# Patient Record
Sex: FEMALE | Race: BLACK OR AFRICAN AMERICAN | URBAN - METROPOLITAN AREA
[De-identification: names, ages, dates, MRNs, and addresses within clinical notes are randomized per-mention and may not be internally consistent; named-entity substitution may affect disease eponyms.]

---

## 2017-01-13 ENCOUNTER — HOME CARE/HOSPICE - HEALTHEAST (OUTPATIENT)
Dept: HOME HEALTH SERVICES | Facility: HOME HEALTH | Age: 57
End: 2017-01-13

## 2017-01-23 ENCOUNTER — HOME CARE/HOSPICE - HEALTHEAST (OUTPATIENT)
Dept: HOME HEALTH SERVICES | Facility: HOME HEALTH | Age: 57
End: 2017-01-23

## 2017-02-06 ENCOUNTER — HOME CARE/HOSPICE - HEALTHEAST (OUTPATIENT)
Dept: HOME HEALTH SERVICES | Facility: HOME HEALTH | Age: 57
End: 2017-02-06

## 2017-02-20 ENCOUNTER — HOME CARE/HOSPICE - HEALTHEAST (OUTPATIENT)
Dept: HOME HEALTH SERVICES | Facility: HOME HEALTH | Age: 57
End: 2017-02-20

## 2017-07-08 ENCOUNTER — HEALTH MAINTENANCE LETTER (OUTPATIENT)
Age: 57
End: 2017-07-08

## 2021-05-25 ENCOUNTER — RECORDS - HEALTHEAST (OUTPATIENT)
Dept: ADMINISTRATIVE | Facility: CLINIC | Age: 61
End: 2021-05-25

## 2021-12-02 ENCOUNTER — TELEPHONE (OUTPATIENT)
Dept: FAMILY MEDICINE CLINIC | Facility: CLINIC | Age: 61
End: 2021-12-02

## 2021-12-03 ENCOUNTER — OFFICE VISIT (OUTPATIENT)
Dept: FAMILY MEDICINE CLINIC | Facility: CLINIC | Age: 61
End: 2021-12-03

## 2021-12-03 ENCOUNTER — LAB (OUTPATIENT)
Dept: LAB | Facility: HOSPITAL | Age: 61
End: 2021-12-03

## 2021-12-03 VITALS
HEART RATE: 82 BPM | DIASTOLIC BLOOD PRESSURE: 98 MMHG | BODY MASS INDEX: 47.98 KG/M2 | WEIGHT: 288 LBS | HEIGHT: 65 IN | TEMPERATURE: 97.3 F | SYSTOLIC BLOOD PRESSURE: 150 MMHG | OXYGEN SATURATION: 96 %

## 2021-12-03 DIAGNOSIS — M48.061 SPINAL STENOSIS OF LUMBAR REGION WITHOUT NEUROGENIC CLAUDICATION: ICD-10-CM

## 2021-12-03 DIAGNOSIS — Z11.59 NEED FOR HEPATITIS C SCREENING TEST: ICD-10-CM

## 2021-12-03 DIAGNOSIS — J44.9 CHRONIC OBSTRUCTIVE PULMONARY DISEASE, UNSPECIFIED COPD TYPE (HCC): ICD-10-CM

## 2021-12-03 DIAGNOSIS — G89.4 CHRONIC PAIN DISORDER: ICD-10-CM

## 2021-12-03 DIAGNOSIS — I10 ESSENTIAL HYPERTENSION: ICD-10-CM

## 2021-12-03 DIAGNOSIS — K21.9 GASTROESOPHAGEAL REFLUX DISEASE WITHOUT ESOPHAGITIS: ICD-10-CM

## 2021-12-03 DIAGNOSIS — E78.5 HYPERLIPIDEMIA, UNSPECIFIED HYPERLIPIDEMIA TYPE: ICD-10-CM

## 2021-12-03 DIAGNOSIS — M50.30 DEGENERATION OF CERVICAL DISC WITHOUT MYELOPATHY: ICD-10-CM

## 2021-12-03 DIAGNOSIS — E04.9 THYROID ENLARGEMENT: Primary | ICD-10-CM

## 2021-12-03 PROBLEM — M17.11 PRIMARY OSTEOARTHRITIS OF RIGHT KNEE: Status: ACTIVE | Noted: 2018-02-07

## 2021-12-03 PROBLEM — E55.9 VITAMIN D DEFICIENCY: Status: ACTIVE | Noted: 2021-12-03

## 2021-12-03 PROCEDURE — 99204 OFFICE O/P NEW MOD 45 MIN: CPT | Performed by: STUDENT IN AN ORGANIZED HEALTH CARE EDUCATION/TRAINING PROGRAM

## 2021-12-03 PROCEDURE — 80050 GENERAL HEALTH PANEL: CPT | Performed by: STUDENT IN AN ORGANIZED HEALTH CARE EDUCATION/TRAINING PROGRAM

## 2021-12-03 PROCEDURE — 86803 HEPATITIS C AB TEST: CPT | Performed by: STUDENT IN AN ORGANIZED HEALTH CARE EDUCATION/TRAINING PROGRAM

## 2021-12-03 PROCEDURE — 86376 MICROSOMAL ANTIBODY EACH: CPT | Performed by: STUDENT IN AN ORGANIZED HEALTH CARE EDUCATION/TRAINING PROGRAM

## 2021-12-03 PROCEDURE — 80061 LIPID PANEL: CPT | Performed by: STUDENT IN AN ORGANIZED HEALTH CARE EDUCATION/TRAINING PROGRAM

## 2021-12-03 PROCEDURE — 81001 URINALYSIS AUTO W/SCOPE: CPT | Performed by: STUDENT IN AN ORGANIZED HEALTH CARE EDUCATION/TRAINING PROGRAM

## 2021-12-03 RX ORDER — ALBUTEROL SULFATE 2.5 MG/3ML
2.5 SOLUTION RESPIRATORY (INHALATION) EVERY 4 HOURS PRN
Qty: 90 ML | Refills: 3 | Status: SHIPPED | OUTPATIENT
Start: 2021-12-03 | End: 2022-01-11 | Stop reason: SDUPTHER

## 2021-12-03 RX ORDER — AMLODIPINE BESYLATE 10 MG/1
10 TABLET ORAL DAILY
Qty: 90 TABLET | Refills: 1 | Status: SHIPPED | OUTPATIENT
Start: 2021-12-03 | End: 2022-04-28 | Stop reason: SDUPTHER

## 2021-12-03 RX ORDER — LOSARTAN POTASSIUM 25 MG/1
25 TABLET ORAL DAILY
COMMUNITY
End: 2021-12-03 | Stop reason: SDUPTHER

## 2021-12-03 RX ORDER — BACLOFEN 10 MG/1
10 TABLET ORAL 3 TIMES DAILY PRN
Qty: 90 TABLET | Refills: 1 | Status: SHIPPED | OUTPATIENT
Start: 2021-12-03 | End: 2022-04-28 | Stop reason: SDUPTHER

## 2021-12-03 RX ORDER — PANTOPRAZOLE SODIUM 40 MG/1
40 TABLET, DELAYED RELEASE ORAL 2 TIMES DAILY
Qty: 90 TABLET | Refills: 1 | Status: SHIPPED | OUTPATIENT
Start: 2021-12-03 | End: 2022-01-17 | Stop reason: SDUPTHER

## 2021-12-03 RX ORDER — MULTIPLE VITAMINS W/ MINERALS TAB 9MG-400MCG
1 TAB ORAL DAILY
COMMUNITY
End: 2022-06-02

## 2021-12-03 RX ORDER — IBUPROFEN 800 MG/1
800 TABLET ORAL EVERY 8 HOURS PRN
COMMUNITY
End: 2021-12-03 | Stop reason: SDUPTHER

## 2021-12-03 RX ORDER — LOSARTAN POTASSIUM 25 MG/1
25 TABLET ORAL DAILY
Qty: 90 TABLET | Refills: 1 | Status: SHIPPED | OUTPATIENT
Start: 2021-12-03 | End: 2022-04-28 | Stop reason: SDUPTHER

## 2021-12-03 RX ORDER — PANTOPRAZOLE SODIUM 40 MG/1
40 TABLET, DELAYED RELEASE ORAL 2 TIMES DAILY
COMMUNITY
End: 2021-12-03 | Stop reason: SDUPTHER

## 2021-12-03 RX ORDER — CHOLECALCIFEROL (VITAMIN D3) 50 MCG
2000 TABLET ORAL DAILY
COMMUNITY
End: 2021-12-03 | Stop reason: SDUPTHER

## 2021-12-03 RX ORDER — ALBUTEROL SULFATE 90 UG/1
2 AEROSOL, METERED RESPIRATORY (INHALATION) EVERY 4 HOURS PRN
COMMUNITY
End: 2021-12-03 | Stop reason: SDUPTHER

## 2021-12-03 RX ORDER — ALBUTEROL SULFATE 2.5 MG/3ML
2.5 SOLUTION RESPIRATORY (INHALATION) EVERY 4 HOURS PRN
COMMUNITY
End: 2021-12-03 | Stop reason: SDUPTHER

## 2021-12-03 RX ORDER — ATENOLOL 100 MG/1
100 TABLET ORAL DAILY
COMMUNITY
End: 2021-12-03 | Stop reason: SDUPTHER

## 2021-12-03 RX ORDER — ALBUTEROL SULFATE 90 UG/1
2 AEROSOL, METERED RESPIRATORY (INHALATION) EVERY 4 HOURS PRN
Qty: 18 G | Refills: 5 | Status: SHIPPED | OUTPATIENT
Start: 2021-12-03

## 2021-12-03 RX ORDER — AMLODIPINE BESYLATE 10 MG/1
10 TABLET ORAL DAILY
COMMUNITY
End: 2021-12-03 | Stop reason: SDUPTHER

## 2021-12-03 RX ORDER — ATENOLOL 50 MG/1
50 TABLET ORAL DAILY
Qty: 90 TABLET | Refills: 1 | Status: SHIPPED | OUTPATIENT
Start: 2021-12-03 | End: 2022-04-26

## 2021-12-03 RX ORDER — IBUPROFEN 800 MG/1
800 TABLET ORAL EVERY 8 HOURS PRN
Qty: 90 TABLET | Refills: 1 | Status: SHIPPED | OUTPATIENT
Start: 2021-12-03 | End: 2022-04-28 | Stop reason: SDUPTHER

## 2021-12-03 RX ORDER — SIMVASTATIN 40 MG
40 TABLET ORAL NIGHTLY
COMMUNITY
End: 2021-12-03 | Stop reason: SDUPTHER

## 2021-12-03 RX ORDER — ATENOLOL 50 MG/1
50 TABLET ORAL DAILY
COMMUNITY
End: 2021-12-03 | Stop reason: SDUPTHER

## 2021-12-03 RX ORDER — CHOLECALCIFEROL (VITAMIN D3) 50 MCG
2000 TABLET ORAL DAILY
Qty: 90 TABLET | Refills: 1 | Status: SHIPPED | OUTPATIENT
Start: 2021-12-03 | End: 2022-02-07

## 2021-12-03 RX ORDER — SIMVASTATIN 40 MG
40 TABLET ORAL NIGHTLY
Qty: 90 TABLET | Refills: 1 | Status: SHIPPED | OUTPATIENT
Start: 2021-12-03 | End: 2022-04-28 | Stop reason: SDUPTHER

## 2021-12-03 RX ORDER — ATENOLOL 100 MG/1
100 TABLET ORAL DAILY
Qty: 90 TABLET | Refills: 1 | Status: SHIPPED | OUTPATIENT
Start: 2021-12-03 | End: 2022-04-26

## 2021-12-03 RX ORDER — BACLOFEN 10 MG/1
10 TABLET ORAL 3 TIMES DAILY PRN
COMMUNITY
End: 2021-12-03 | Stop reason: SDUPTHER

## 2021-12-03 NOTE — PROGRESS NOTES
Subjective:  Norah Urbina is a 61 y.o. female who presents for establish care.    COPD; currently smokes half pack a day for 37 years, on albuterol as needed, no chest pain, shortness of breath, dyspnea on exertion, wheezing, cough, fever, chills, nausea, vomiting.  Denies any nighttime awakenings, uses albuterol approximately once per week, has not had any formal testing before. Does have seasonal allergies which she takes cetirizine    GERD; currently on pantoprazole 40 mg daily, tolerate medication well, no adverse effects.  Denies dysphagia, early satiety, hematemesis, hematochezia.  Has had an EGD in the past which was reportedly normal.  No night sweats or unintentional weight loss.    Hypertension; currently on losartan 25 mg daily, atenolol 100 mg a.m., 50 mg p.m., Amlodipine 10 mg daily.  Denied issues medication, blood pressure today was 150/98, states blood pressure at home has been well controlled but ran out of her BP medications yesterday.  Denied any headaches or vision changes.    Chronic pain; has had multiple lower back surgeries and subsequent chronic pain. Is allergic to all opiate medications, was doing PT/OT and getting injections/nerve ablation therapy. Currently uses a walker to ambulate.  Denied any recent exacerbation, trauma, numbness, tingling, weakness.    Patient Active Problem List   Diagnosis   • Chronic pain disorder   • Controlled substance agreement signed   • Degeneration of cervical disc without myelopathy   • Esophageal reflux   • Essential hypertension   • Hip pain, bilateral   • Hyperlipidemia   • Lumbar spinal stenosis   • Morbid obesity with BMI of 40.0-44.9, adult (HCC)   • Primary osteoarthritis of right knee   • Serum calcium elevated   • Tobacco use disorder   • Vitamin D deficiency     Vitals:    Vitals:    12/03/21 0935   BP: 150/98   BP Location: Right arm   Patient Position: Sitting   Cuff Size: Large Adult   Pulse: 82   Temp: 97.3 °F (36.3 °C)   SpO2: 96%  "  Weight: 131 kg (288 lb)   Height: 165.1 cm (65\")     Body mass index is 47.93 kg/m².      Current Outpatient Medications:   •  albuterol (PROVENTIL) (2.5 MG/3ML) 0.083% nebulizer solution, Take 2.5 mg by nebulization Every 4 (Four) Hours As Needed for Wheezing., Disp: 90 mL, Rfl: 3  •  albuterol sulfate  (90 Base) MCG/ACT inhaler, Inhale 2 puffs Every 4 (Four) Hours As Needed for Wheezing., Disp: 18 g, Rfl: 5  •  amLODIPine (NORVASC) 10 MG tablet, Take 1 tablet by mouth Daily., Disp: 90 tablet, Rfl: 1  •  atenolol (TENORMIN) 100 MG tablet, Take 1 tablet by mouth Daily. 100 in AM, Disp: 90 tablet, Rfl: 1  •  atenolol (TENORMIN) 50 MG tablet, Take 1 tablet by mouth Daily. 50 in PM, Disp: 90 tablet, Rfl: 1  •  baclofen (LIORESAL) 10 MG tablet, Take 1 tablet by mouth 3 (Three) Times a Day As Needed for Muscle Spasms., Disp: 90 tablet, Rfl: 1  •  Cholecalciferol (Vitamin D) 50 MCG (2000 UT) tablet, Take 2,000 Units by mouth Daily., Disp: 90 tablet, Rfl: 1  •  ibuprofen (ADVIL,MOTRIN) 800 MG tablet, Take 1 tablet by mouth Every 8 (Eight) Hours As Needed for Mild Pain  or Moderate Pain ., Disp: 90 tablet, Rfl: 1  •  losartan (COZAAR) 25 MG tablet, Take 1 tablet by mouth Daily., Disp: 90 tablet, Rfl: 1  •  multivitamin with minerals (WOMENS MULTIVITAMIN PO), Take 1 tablet by mouth Daily. gummy, Disp: , Rfl:   •  pantoprazole (PROTONIX) 40 MG EC tablet, Take 1 tablet by mouth 2 (Two) Times a Day., Disp: 90 tablet, Rfl: 1  •  Probiotic Product (PROBIOTIC PO), Take 1 capsule by mouth Daily., Disp: , Rfl:   •  simvastatin (ZOCOR) 40 MG tablet, Take 1 tablet by mouth Every Night., Disp: 90 tablet, Rfl: 1    Patient Active Problem List   Diagnosis   • Chronic pain disorder   • Controlled substance agreement signed   • Degeneration of cervical disc without myelopathy   • Esophageal reflux   • Essential hypertension   • Hip pain, bilateral   • Hyperlipidemia   • Lumbar spinal stenosis   • Morbid obesity with BMI of " 40.0-44.9, adult (HCC)   • Primary osteoarthritis of right knee   • Serum calcium elevated   • Tobacco use disorder   • Vitamin D deficiency     Past Surgical History:   Procedure Laterality Date   • ARM NERVE EXPLORATION Bilateral     both elbows   • BACK SURGERY      x2   • EXPLORATORY LAPAROTOMY     • MOUTH SURGERY      x2   • REPLACEMENT TOTAL KNEE Right    • SHOULDER SURGERY Bilateral     exploratory surgery x5   • TUBAL ABDOMINAL LIGATION       Social History     Socioeconomic History   • Marital status: Single   Tobacco Use   • Smoking status: Current Every Day Smoker     Packs/day: 0.50     Types: Cigarettes   • Smokeless tobacco: Never Used   Vaping Use   • Vaping Use: Never used   Substance and Sexual Activity   • Alcohol use: Not Currently   • Drug use: Never   • Sexual activity: Not Currently     History reviewed. No pertinent family history.  No results found for any previous visit.      No image results found.      @[facility]@  Immunization History   Administered Date(s) Administered   • Tdap 01/11/2018     The following portions of the patient's history were reviewed and updated as appropriate: allergies, current medications, past family history, past medical history, past social history, past surgical history and problem list.    PHQ-9 Total Score: 0         Physical Exam  Constitutional:       Appearance: Normal appearance. She is obese.      Comments: Ambulating with walker   HENT:      Head: Normocephalic and atraumatic.      Right Ear: External ear normal.      Left Ear: External ear normal.   Eyes:      General:         Right eye: No discharge.         Left eye: No discharge.      Conjunctiva/sclera: Conjunctivae normal.   Cardiovascular:      Rate and Rhythm: Normal rate and regular rhythm.      Pulses: Normal pulses.      Heart sounds: Normal heart sounds. No murmur heard.      Pulmonary:      Effort: Pulmonary effort is normal. No respiratory distress.      Breath sounds: Normal breath  sounds.   Abdominal:      General: There is no distension.      Palpations: Abdomen is soft.      Tenderness: There is no abdominal tenderness.   Musculoskeletal:      Cervical back: Normal range of motion.      Right lower leg: No edema.      Left lower leg: No edema.   Lymphadenopathy:      Cervical: No cervical adenopathy.   Neurological:      Mental Status: She is alert. Mental status is at baseline.   Psychiatric:         Mood and Affect: Mood normal.         Behavior: Behavior normal.         Assessment/Plan    Diagnosis Plan   1. Thyroid enlargement  TSH Rfx On Abnormal To Free T4    US Thyroid    Thyroid Peroxidase Antibody   2. Hyperlipidemia, unspecified hyperlipidemia type  Lipid Panel    simvastatin (ZOCOR) 40 MG tablet   3. Essential hypertension  CBC & Differential    Comprehensive Metabolic Panel    amLODIPine (NORVASC) 10 MG tablet    atenolol (TENORMIN) 100 MG tablet    atenolol (TENORMIN) 50 MG tablet    losartan (COZAAR) 25 MG tablet    Urinalysis With Microscopic - Urine, Clean Catch   4. Need for hepatitis C screening test  HCV Antibody Rfx To Qnt PCR   5. Chronic obstructive pulmonary disease, unspecified COPD type (HCC)  albuterol (PROVENTIL) (2.5 MG/3ML) 0.083% nebulizer solution    albuterol sulfate  (90 Base) MCG/ACT inhaler   6. Gastroesophageal reflux disease without esophagitis  pantoprazole (PROTONIX) 40 MG EC tablet   7. Chronic pain disorder  baclofen (LIORESAL) 10 MG tablet    ibuprofen (ADVIL,MOTRIN) 800 MG tablet    Ambulatory Referral to Pain Management    Ambulatory Referral to Physical Therapy Evaluate and treat   8. Degeneration of cervical disc without myelopathy  baclofen (LIORESAL) 10 MG tablet    ibuprofen (ADVIL,MOTRIN) 800 MG tablet    Ambulatory Referral to Pain Management    Ambulatory Referral to Physical Therapy Evaluate and treat   9. Spinal stenosis of lumbar region without neurogenic claudication  baclofen (LIORESAL) 10 MG tablet    ibuprofen (ADVIL,MOTRIN)  800 MG tablet    Ambulatory Referral to Pain Management    Ambulatory Referral to Physical Therapy Evaluate and treat      Orders Placed This Encounter   Procedures   • US Thyroid     Standing Status:   Future     Standing Expiration Date:   12/3/2022     Order Specific Question:   Reason for Exam:     Answer:   diffuse thyroid enlargement on MRI     Order Specific Question:   Release to patient     Answer:   Immediate   • Comprehensive Metabolic Panel     Order Specific Question:   Release to patient     Answer:   Immediate   • TSH Rfx On Abnormal To Free T4     Order Specific Question:   Release to patient     Answer:   Immediate   • Lipid Panel   • HCV Antibody Rfx To Qnt PCR     Order Specific Question:   Release to patient     Answer:   Immediate   • Thyroid Peroxidase Antibody     Order Specific Question:   Release to patient     Answer:   Immediate   • CBC Auto Differential   • Urinalysis without microscopic (no culture) - Urine, Clean Catch     Order Specific Question:   Release to patient     Answer:   Immediate   • Urinalysis, Microscopic Only - Urine, Clean Catch     Order Specific Question:   Release to patient     Answer:   Immediate   • Ambulatory Referral to Pain Management     Referral Priority:   Routine     Referral Type:   Pain Management     Referral Reason:   Specialty Services Required     Requested Specialty:   Pain Medicine     Number of Visits Requested:   1   • Ambulatory Referral to Physical Therapy Evaluate and treat     Referral Priority:   Routine     Referral Type:   Physical Therapy     Referral Reason:   Specialty Services Required     Requested Specialty:   Physical Therapy     Number of Visits Requested:   1   • CBC & Differential     Order Specific Question:   Manual Differential     Answer:   No   • Urinalysis With Microscopic - Urine, Clean Catch     Order Specific Question:   Release to patient     Answer:   Immediate      Thyroid enlargement; asymptomatic physical exam, will  obtain lab work as above, denies dysphagia or additional red flags, reassuring.    Hyperlipidemia; currently on simvastatin 40 mg daily, tolerating occasion well, no adverse effects, will continue and obtain lipid levels as above.    Hypertension; elevated today however did not take her medication prior to today's visit as she ran out, denies any adverse effects with medication, states blood pressure well controlled, will refill, follow-up 1 month with blood pressure log, sooner if needed.    COPD?;  Patient on albuterol as needed, has been using weekly, no red flags, lungs clear to auscultation bilaterally, afebrile, saturating well on room air, reassuring.  Will obtain PFTs as above, counseled importance of smoke cessation, patient has tried nicotine replacement therapies and oral therapies without relief.    Chronic pain secondary to multiple back surgeries; will refer to pain management, physical therapy as she was seeing them in [place] and receiving nerve ablation, injections, pain medications and therapy.  Will obtain prior records.  Patient appropriate on exam, no signs of abuse, misuse.  Denies any acute exacerbations, reassuring.    We will obtain prior records, follow-up in 1 month.      This document has been electronically signed by Santiago Quan MD on December 3, 2021 11:18 CST

## 2021-12-04 LAB
ALBUMIN SERPL-MCNC: 4.5 G/DL (ref 3.5–5.2)
ALBUMIN/GLOB SERPL: 1.5 G/DL
ALP SERPL-CCNC: 94 U/L (ref 39–117)
ALT SERPL W P-5'-P-CCNC: 22 U/L (ref 1–33)
ANION GAP SERPL CALCULATED.3IONS-SCNC: 9.5 MMOL/L (ref 5–15)
AST SERPL-CCNC: 27 U/L (ref 1–32)
BACTERIA UR QL AUTO: ABNORMAL /HPF
BASOPHILS # BLD AUTO: 0.07 10*3/MM3 (ref 0–0.2)
BASOPHILS NFR BLD AUTO: 0.9 % (ref 0–1.5)
BILIRUB SERPL-MCNC: 0.4 MG/DL (ref 0–1.2)
BILIRUB UR QL STRIP: NEGATIVE
BUN SERPL-MCNC: 13 MG/DL (ref 8–23)
BUN/CREAT SERPL: 14 (ref 7–25)
CALCIUM SPEC-SCNC: 9.7 MG/DL (ref 8.6–10.5)
CHLORIDE SERPL-SCNC: 101 MMOL/L (ref 98–107)
CHOLEST SERPL-MCNC: 202 MG/DL (ref 0–200)
CLARITY UR: CLEAR
CO2 SERPL-SCNC: 26.5 MMOL/L (ref 22–29)
COLOR UR: YELLOW
CREAT SERPL-MCNC: 0.93 MG/DL (ref 0.57–1)
DEPRECATED RDW RBC AUTO: 44.5 FL (ref 37–54)
EOSINOPHIL # BLD AUTO: 0.09 10*3/MM3 (ref 0–0.4)
EOSINOPHIL NFR BLD AUTO: 1.1 % (ref 0.3–6.2)
ERYTHROCYTE [DISTWIDTH] IN BLOOD BY AUTOMATED COUNT: 14.4 % (ref 12.3–15.4)
GFR SERPL CREATININE-BSD FRML MDRD: 74 ML/MIN/1.73
GLOBULIN UR ELPH-MCNC: 3 GM/DL
GLUCOSE SERPL-MCNC: 102 MG/DL (ref 65–99)
GLUCOSE UR STRIP-MCNC: NEGATIVE MG/DL
HCT VFR BLD AUTO: 46.5 % (ref 34–46.6)
HDLC SERPL-MCNC: 55 MG/DL (ref 40–60)
HGB BLD-MCNC: 14.4 G/DL (ref 12–15.9)
HGB UR QL STRIP.AUTO: ABNORMAL
HYALINE CASTS UR QL AUTO: ABNORMAL /LPF
IMM GRANULOCYTES # BLD AUTO: 0.02 10*3/MM3 (ref 0–0.05)
IMM GRANULOCYTES NFR BLD AUTO: 0.2 % (ref 0–0.5)
KETONES UR QL STRIP: NEGATIVE
LDLC SERPL CALC-MCNC: 110 MG/DL (ref 0–100)
LDLC/HDLC SERPL: 1.89 {RATIO}
LEUKOCYTE ESTERASE UR QL STRIP.AUTO: NEGATIVE
LYMPHOCYTES # BLD AUTO: 2.25 10*3/MM3 (ref 0.7–3.1)
LYMPHOCYTES NFR BLD AUTO: 27.7 % (ref 19.6–45.3)
MCH RBC QN AUTO: 26.3 PG (ref 26.6–33)
MCHC RBC AUTO-ENTMCNC: 31 G/DL (ref 31.5–35.7)
MCV RBC AUTO: 84.9 FL (ref 79–97)
MONOCYTES # BLD AUTO: 0.49 10*3/MM3 (ref 0.1–0.9)
MONOCYTES NFR BLD AUTO: 6 % (ref 5–12)
NEUTROPHILS NFR BLD AUTO: 5.21 10*3/MM3 (ref 1.7–7)
NEUTROPHILS NFR BLD AUTO: 64.1 % (ref 42.7–76)
NITRITE UR QL STRIP: NEGATIVE
NRBC BLD AUTO-RTO: 0 /100 WBC (ref 0–0.2)
PH UR STRIP.AUTO: 6 [PH] (ref 5–8)
PLATELET # BLD AUTO: 250 10*3/MM3 (ref 140–450)
PMV BLD AUTO: 12.3 FL (ref 6–12)
POTASSIUM SERPL-SCNC: 4.1 MMOL/L (ref 3.5–5.2)
PROT SERPL-MCNC: 7.5 G/DL (ref 6–8.5)
PROT UR QL STRIP: NEGATIVE
RBC # BLD AUTO: 5.48 10*6/MM3 (ref 3.77–5.28)
RBC # UR STRIP: ABNORMAL /HPF
REF LAB TEST METHOD: ABNORMAL
SODIUM SERPL-SCNC: 137 MMOL/L (ref 136–145)
SP GR UR STRIP: 1.01 (ref 1–1.03)
SQUAMOUS #/AREA URNS HPF: ABNORMAL /HPF
TRIGL SERPL-MCNC: 215 MG/DL (ref 0–150)
TSH SERPL DL<=0.05 MIU/L-ACNC: 0.94 UIU/ML (ref 0.27–4.2)
UROBILINOGEN UR QL STRIP: ABNORMAL
VLDLC SERPL-MCNC: 37 MG/DL (ref 5–40)
WBC # UR STRIP: ABNORMAL /HPF
WBC NRBC COR # BLD: 8.13 10*3/MM3 (ref 3.4–10.8)

## 2021-12-05 LAB
HCV AB S/CO SERPL IA: <0.1 S/CO RATIO (ref 0–0.9)
HCV AB SERPL QL IA: NORMAL
THYROPEROXIDASE AB SERPL-ACNC: <8 IU/ML (ref 0–34)

## 2021-12-07 ENCOUNTER — TELEPHONE (OUTPATIENT)
Dept: FAMILY MEDICINE CLINIC | Facility: CLINIC | Age: 61
End: 2021-12-07

## 2021-12-07 DIAGNOSIS — J44.9 CHRONIC OBSTRUCTIVE PULMONARY DISEASE, UNSPECIFIED COPD TYPE (HCC): Primary | ICD-10-CM

## 2021-12-07 NOTE — TELEPHONE ENCOUNTER
Patient recently moved here and was wondering if she needed to fill out any paperwork for her daughter to be her caregiver. Contact number is [phone]

## 2021-12-08 NOTE — TELEPHONE ENCOUNTER
If she wants it legally, she will need to see an  or a  to get her daughter to be power of  for her to be in charge of her financial and medical. She also could go to court to get her to be POA.    LVM

## 2021-12-08 NOTE — TELEPHONE ENCOUNTER
Pt called back and I explained the above mentioned information. She also said that she needs a script for a new nebulizer. I told her I would put it in and that I would send it to [facility]. I gave her the number to call. She voiced understanding.    Faxed order over.

## 2021-12-28 ENCOUNTER — TELEPHONE (OUTPATIENT)
Dept: FAMILY MEDICINE CLINIC | Facility: CLINIC | Age: 61
End: 2021-12-28

## 2021-12-28 NOTE — TELEPHONE ENCOUNTER
----- Message from Santiago Quan MD sent at 12/22/2021  2:47 PM CST -----  Overall, labs reassuring for thyroid disease, hepatitis C, kidney disease, liver disease.  Cholesterol remains to be elevated, please continue to take simvastatin daily.

## 2022-01-04 ENCOUNTER — TELEPHONE (OUTPATIENT)
Dept: FAMILY MEDICINE CLINIC | Facility: CLINIC | Age: 62
End: 2022-01-04

## 2022-01-04 ENCOUNTER — OFFICE VISIT (OUTPATIENT)
Dept: FAMILY MEDICINE CLINIC | Facility: CLINIC | Age: 62
End: 2022-01-04

## 2022-01-04 VITALS
TEMPERATURE: 96.6 F | BODY MASS INDEX: 47.48 KG/M2 | OXYGEN SATURATION: 96 % | HEIGHT: 65 IN | WEIGHT: 285 LBS | SYSTOLIC BLOOD PRESSURE: 138 MMHG | HEART RATE: 73 BPM | DIASTOLIC BLOOD PRESSURE: 86 MMHG

## 2022-01-04 DIAGNOSIS — I10 ESSENTIAL HYPERTENSION: ICD-10-CM

## 2022-01-04 DIAGNOSIS — M48.02 CERVICAL STENOSIS OF SPINE: Primary | ICD-10-CM

## 2022-01-04 DIAGNOSIS — M48.061 SPINAL STENOSIS OF LUMBAR REGION WITHOUT NEUROGENIC CLAUDICATION: ICD-10-CM

## 2022-01-04 PROCEDURE — 99214 OFFICE O/P EST MOD 30 MIN: CPT | Performed by: STUDENT IN AN ORGANIZED HEALTH CARE EDUCATION/TRAINING PROGRAM

## 2022-01-04 NOTE — PROGRESS NOTES
"Subjective:  Norah Urbina is a 61 y.o. female who presents for     Chronic pain secondary to multiple back surgeries; at previous appointment was referred to pain management, physical therapy patient states that has not heard back.  Denied acute complaints, states and has had chronic numbness, tingling and pain of bilateral hands.  Denied trauma, swelling, fever, chills, nausea, vomiting, weakness.    Hypertension; previous appointment, patient with elevated blood pressure however did not have medications, currently on losartan 25 mg daily, atenolol 50 mg twice daily, amlodipine 10 mg daily, does not check BP at home.    Health Maintenance; patient states mammogram was completed in [place] last August, reportedly normal, states had colonoscopy ~ 3 years ago, had some polyps removed but does not remember follow up recommendations.     Patient Active Problem List   Diagnosis   • Chronic pain disorder   • Controlled substance agreement signed   • Degeneration of cervical disc without myelopathy   • Esophageal reflux   • Essential hypertension   • Hip pain, bilateral   • Hyperlipidemia   • Lumbar spinal stenosis   • Morbid obesity with BMI of 40.0-44.9, adult (HCC)   • Primary osteoarthritis of right knee   • Serum calcium elevated   • Tobacco use disorder   • Vitamin D deficiency     Vitals:    Vitals:    01/04/22 1008   BP: 138/86   BP Location: Right arm   Patient Position: Sitting   Cuff Size: Large Adult   Pulse: 73   Temp: 96.6 °F (35.9 °C)   SpO2: 96%   Weight: 129 kg (285 lb)   Height: 165.1 cm (65\")     Body mass index is 47.43 kg/m².      Current Outpatient Medications:   •  albuterol (PROVENTIL) (2.5 MG/3ML) 0.083% nebulizer solution, Take 2.5 mg by nebulization Every 4 (Four) Hours As Needed for Wheezing., Disp: 90 mL, Rfl: 3  •  albuterol sulfate  (90 Base) MCG/ACT inhaler, Inhale 2 puffs Every 4 (Four) Hours As Needed for Wheezing., Disp: 18 g, Rfl: 5  •  amLODIPine (NORVASC) 10 MG tablet, Take 1 " tablet by mouth Daily., Disp: 90 tablet, Rfl: 1  •  atenolol (TENORMIN) 100 MG tablet, Take 1 tablet by mouth Daily. 100 in AM, Disp: 90 tablet, Rfl: 1  •  atenolol (TENORMIN) 50 MG tablet, Take 1 tablet by mouth Daily. 50 in PM, Disp: 90 tablet, Rfl: 1  •  baclofen (LIORESAL) 10 MG tablet, Take 1 tablet by mouth 3 (Three) Times a Day As Needed for Muscle Spasms., Disp: 90 tablet, Rfl: 1  •  ibuprofen (ADVIL,MOTRIN) 800 MG tablet, Take 1 tablet by mouth Every 8 (Eight) Hours As Needed for Mild Pain  or Moderate Pain ., Disp: 90 tablet, Rfl: 1  •  losartan (COZAAR) 25 MG tablet, Take 1 tablet by mouth Daily., Disp: 90 tablet, Rfl: 1  •  multivitamin with minerals (WOMENS MULTIVITAMIN PO), Take 1 tablet by mouth Daily. gummy, Disp: , Rfl:   •  Probiotic Product (PROBIOTIC PO), Take 1 capsule by mouth Daily., Disp: , Rfl:   •  simvastatin (ZOCOR) 40 MG tablet, Take 1 tablet by mouth Every Night., Disp: 90 tablet, Rfl: 1  •  Cholecalciferol (Vitamin D) 50 MCG (2000 UT) tablet, Take 2,000 Units by mouth Daily., Disp: 90 tablet, Rfl: 1  •  pantoprazole (PROTONIX) 40 MG EC tablet, Take 1 tablet by mouth 2 (Two) Times a Day., Disp: 90 tablet, Rfl: 1    Patient Active Problem List   Diagnosis   • Chronic pain disorder   • Controlled substance agreement signed   • Degeneration of cervical disc without myelopathy   • Esophageal reflux   • Essential hypertension   • Hip pain, bilateral   • Hyperlipidemia   • Lumbar spinal stenosis   • Morbid obesity with BMI of 40.0-44.9, adult (HCC)   • Primary osteoarthritis of right knee   • Serum calcium elevated   • Tobacco use disorder   • Vitamin D deficiency     Past Surgical History:   Procedure Laterality Date   • ARM NERVE EXPLORATION Bilateral     both elbows   • BACK SURGERY      x2   • EXPLORATORY LAPAROTOMY     • MOUTH SURGERY      x2   • REPLACEMENT TOTAL KNEE Right    • SHOULDER SURGERY Bilateral     exploratory surgery x5   • TUBAL ABDOMINAL LIGATION       Social History      Socioeconomic History   • Marital status: Single   Tobacco Use   • Smoking status: Current Every Day Smoker     Packs/day: 0.50     Types: Cigarettes   • Smokeless tobacco: Never Used   Vaping Use   • Vaping Use: Never used   Substance and Sexual Activity   • Alcohol use: Not Currently   • Drug use: Yes     Types: Marijuana     Comment: once every 2 weeks or so   • Sexual activity: Not Currently     History reviewed. No pertinent family history.  Office Visit on 12/03/2021   Component Date Value Ref Range Status   • Glucose 12/03/2021 102* 65 - 99 mg/dL Final   • BUN 12/03/2021 13  8 - 23 mg/dL Final   • Creatinine 12/03/2021 0.93  0.57 - 1.00 mg/dL Final   • Sodium 12/03/2021 137  136 - 145 mmol/L Final   • Potassium 12/03/2021 4.1  3.5 - 5.2 mmol/L Final   • Chloride 12/03/2021 101  98 - 107 mmol/L Final   • CO2 12/03/2021 26.5  22.0 - 29.0 mmol/L Final   • Calcium 12/03/2021 9.7  8.6 - 10.5 mg/dL Final   • Total Protein 12/03/2021 7.5  6.0 - 8.5 g/dL Final   • Albumin 12/03/2021 4.50  3.50 - 5.20 g/dL Final   • ALT (SGPT) 12/03/2021 22  1 - 33 U/L Final   • AST (SGOT) 12/03/2021 27  1 - 32 U/L Final   • Alkaline Phosphatase 12/03/2021 94  39 - 117 U/L Final   • Total Bilirubin 12/03/2021 0.4  0.0 - 1.2 mg/dL Final   • eGFR   Amer 12/03/2021 74  >60 mL/min/1.73 Final   • Globulin 12/03/2021 3.0  gm/dL Final   • A/G Ratio 12/03/2021 1.5  g/dL Final   • BUN/Creatinine Ratio 12/03/2021 14.0  7.0 - 25.0 Final   • Anion Gap 12/03/2021 9.5  5.0 - 15.0 mmol/L Final   • TSH 12/03/2021 0.944  0.270 - 4.200 uIU/mL Final   • Total Cholesterol 12/03/2021 202* 0 - 200 mg/dL Final   • Triglycerides 12/03/2021 215* 0 - 150 mg/dL Final   • HDL Cholesterol 12/03/2021 55  40 - 60 mg/dL Final   • LDL Cholesterol  12/03/2021 110* 0 - 100 mg/dL Final   • VLDL Cholesterol 12/03/2021 37  5 - 40 mg/dL Final   • LDL/HDL Ratio 12/03/2021 1.89   Final   • Hepatitis C Ab 12/03/2021 <0.1  0.0 - 0.9 s/co ratio Final   • Thyroid  Peroxidase Antibody 12/03/2021 <8  0 - 34 IU/mL Final   • WBC 12/03/2021 8.13  3.40 - 10.80 10*3/mm3 Final   • RBC 12/03/2021 5.48* 3.77 - 5.28 10*6/mm3 Final   • Hemoglobin 12/03/2021 14.4  12.0 - 15.9 g/dL Final   • Hematocrit 12/03/2021 46.5  34.0 - 46.6 % Final   • MCV 12/03/2021 84.9  79.0 - 97.0 fL Final   • MCH 12/03/2021 26.3* 26.6 - 33.0 pg Final   • MCHC 12/03/2021 31.0* 31.5 - 35.7 g/dL Final   • RDW 12/03/2021 14.4  12.3 - 15.4 % Final   • RDW-SD 12/03/2021 44.5  37.0 - 54.0 fl Final   • MPV 12/03/2021 12.3* 6.0 - 12.0 fL Final   • Platelets 12/03/2021 250  140 - 450 10*3/mm3 Final   • Neutrophil % 12/03/2021 64.1  42.7 - 76.0 % Final   • Lymphocyte % 12/03/2021 27.7  19.6 - 45.3 % Final   • Monocyte % 12/03/2021 6.0  5.0 - 12.0 % Final   • Eosinophil % 12/03/2021 1.1  0.3 - 6.2 % Final   • Basophil % 12/03/2021 0.9  0.0 - 1.5 % Final   • Immature Grans % 12/03/2021 0.2  0.0 - 0.5 % Final   • Neutrophils, Absolute 12/03/2021 5.21  1.70 - 7.00 10*3/mm3 Final   • Lymphocytes, Absolute 12/03/2021 2.25  0.70 - 3.10 10*3/mm3 Final   • Monocytes, Absolute 12/03/2021 0.49  0.10 - 0.90 10*3/mm3 Final   • Eosinophils, Absolute 12/03/2021 0.09  0.00 - 0.40 10*3/mm3 Final   • Basophils, Absolute 12/03/2021 0.07  0.00 - 0.20 10*3/mm3 Final   • Immature Grans, Absolute 12/03/2021 0.02  0.00 - 0.05 10*3/mm3 Final   • nRBC 12/03/2021 0.0  0.0 - 0.2 /100 WBC Final   • Color, UA 12/03/2021 Yellow  Yellow, Straw Final   • Appearance, UA 12/03/2021 Clear  Clear Final   • pH, UA 12/03/2021 6.0  5.0 - 8.0 Final   • Specific Gravity, UA 12/03/2021 1.009  1.005 - 1.030 Final   • Glucose, UA 12/03/2021 Negative  Negative Final   • Ketones, UA 12/03/2021 Negative  Negative Final   • Bilirubin, UA 12/03/2021 Negative  Negative Final   • Blood, UA 12/03/2021 Trace* Negative Final   • Protein, UA 12/03/2021 Negative  Negative Final   • Leuk Esterase, UA 12/03/2021 Negative  Negative Final   • Nitrite, UA 12/03/2021 Negative   Negative Final   • Urobilinogen, UA 12/03/2021 0.2 E.U./dL  0.2 - 1.0 E.U./dL Final   • RBC, UA 12/03/2021 0-2  None Seen, 0-2 /HPF Final   • WBC, UA 12/03/2021 0-2  None Seen, 0-2 /HPF Final   • Bacteria, UA 12/03/2021 2+* None Seen /HPF Final   • Squamous Epithelial Cells, UA 12/03/2021 3-6* None Seen, 0-2 /HPF Final   • Hyaline Casts, UA 12/03/2021 0-2  None Seen /LPF Final   • Methodology 12/03/2021 Automated Microscopy   Final   • Interpretation 12/03/2021 Comment   Final    Negative  Not infected with HCV, unless recent infection is suspected or other  evidence exists to indicate HCV infection.      No image results found.      [email or web address]  Immunization History   Administered Date(s) Administered   • Tdap 01/11/2018     The following portions of the patient's history were reviewed and updated as appropriate: allergies, current medications, past family history, past medical history, past social history, past surgical history and problem list.    PHQ-9 Total Score: 0         Physical Exam  Constitutional:       Appearance: Normal appearance.   HENT:      Head: Normocephalic and atraumatic.      Right Ear: External ear normal.      Left Ear: External ear normal.   Eyes:      General:         Right eye: No discharge.         Left eye: No discharge.      Conjunctiva/sclera: Conjunctivae normal.   Cardiovascular:      Rate and Rhythm: Normal rate and regular rhythm.      Pulses: Normal pulses.           Radial pulses are 2+ on the right side and 2+ on the left side.      Heart sounds: Normal heart sounds. No murmur heard.      Pulmonary:      Effort: Pulmonary effort is normal. No respiratory distress.      Breath sounds: Normal breath sounds.   Abdominal:      General: There is no distension.      Palpations: Abdomen is soft.      Tenderness: There is no abdominal tenderness.   Musculoskeletal:      Cervical back: Normal range of motion.      Right lower leg: No edema.      Left lower leg: No edema.    Lymphadenopathy:      Cervical: No cervical adenopathy.   Neurological:      Mental Status: She is alert. Mental status is at baseline.   Psychiatric:         Mood and Affect: Mood normal.         Behavior: Behavior normal.         Assessment/Plan    Diagnosis Plan   1. Cervical stenosis of spine  Ambulatory Referral to Neurosurgery   2. Essential hypertension     3. Spinal stenosis of lumbar region without neurogenic claudication        Orders Placed This Encounter   Procedures   • Ambulatory Referral to Neurosurgery     Referral Priority:   Routine     Referral Type:   Consultation     Referral Reason:   Specialty Services Required     Requested Specialty:   Neurosurgery     Number of Visits Requested:   1     Cervical and lumbar stenosis; concerning for neurogenic claudication of upper extremities, pulses intact, no appreciable weakness noted, no gross deformities noted.  Limited availability of prior records, MRI supports diagnosis, awaiting pain management for injections, unable to tolerate opiate medications.  Continue conservative management, refer for neurosurgery, consider EMG if not previously obtained.  Will attempt to get prior records, may need to restart diagnostic work-up at follow-up if unavailable.    Hypertension; well-controlled on current medications, continue current management.       This document has been electronically signed by Santiago Quan MD on January 4, 2022 14:43 CST    EMR Dragon/Transciption Disclaimer: Some of this note may be an electronic transcription/translation of spoken language to printed text.  The electronic translation of spoken language may permit erroneous, or at times, nonsensical words or phrases to be inadvertently transcribed. Although I have reviewed the note for such errors, some may still exist.

## 2022-01-04 NOTE — TELEPHONE ENCOUNTER
Patient came in today stating that her medication was not approved thru her insurance.    Sent a PA through covermymeds.com for her Protonix 40 mg BID.      Approved. Pt aware. She is going to call pharmacy.

## 2022-01-05 ENCOUNTER — TELEPHONE (OUTPATIENT)
Dept: FAMILY MEDICINE CLINIC | Facility: CLINIC | Age: 62
End: 2022-01-05

## 2022-01-05 NOTE — TELEPHONE ENCOUNTER
Patient called and she stated her pharmacy is out of the albuterol solution and they do not know when they will get any. She asked if it could get called in somewhere else. She doesn't know there area well and don't know where else to go.

## 2022-01-05 NOTE — TELEPHONE ENCOUNTER
Pt called asking about if Dr Quan was going to send in Nebulizer solution. I told her he sent it in December and she needs to request it to be filled at the pharmacy. She voiced understanding.

## 2022-01-06 NOTE — TELEPHONE ENCOUNTER
Returned call to pt to provide names of other pharmacys for her to check to see if they had the nebulizer solution for her. If she finds at another pharmacy she can have [facility] transfer it for her. If cannot find med at another pharmacy, will contact office to see if PCP can prescribe something else.

## 2022-01-10 DIAGNOSIS — J44.9 CHRONIC OBSTRUCTIVE PULMONARY DISEASE, UNSPECIFIED COPD TYPE: ICD-10-CM

## 2022-01-10 NOTE — TELEPHONE ENCOUNTER
Patient called and and stated that [facility] is out of this albuterol (PROVENTIL) (2.5 MG/3ML) 0.083% nebulizer solution. It is on back order and they wont have it for a while.. [facility] does have it.. Can you send this to [facility] please.. she called them and was told tht they would not just call another pharmacy to switch the prescription over, that the  Has to send a new script..Please call patient when done

## 2022-01-12 ENCOUNTER — HOSPITAL ENCOUNTER (OUTPATIENT)
Dept: PHYSICAL THERAPY | Facility: HOSPITAL | Age: 62
Setting detail: THERAPIES SERIES
Discharge: HOME | End: 2022-01-12

## 2022-01-12 DIAGNOSIS — M50.30 DEGENERATION OF CERVICAL DISC WITHOUT MYELOPATHY: ICD-10-CM

## 2022-01-12 DIAGNOSIS — G89.4 CHRONIC PAIN DISORDER: Primary | ICD-10-CM

## 2022-01-12 DIAGNOSIS — M48.061 SPINAL STENOSIS OF LUMBAR REGION WITHOUT NEUROGENIC CLAUDICATION: ICD-10-CM

## 2022-01-12 PROCEDURE — 97162 PT EVAL MOD COMPLEX 30 MIN: CPT | Performed by: PHYSICAL THERAPIST

## 2022-01-13 RX ORDER — ALBUTEROL SULFATE 2.5 MG/3ML
2.5 SOLUTION RESPIRATORY (INHALATION) EVERY 4 HOURS PRN
Qty: 90 ML | Refills: 3 | Status: SHIPPED | OUTPATIENT
Start: 2022-01-13 | End: 2023-03-20

## 2022-01-13 NOTE — THERAPY EVALUATION
Outpatient Physical Therapy Ortho Initial Evaluation  [facility]     Patient Name: Norah Urbina  : 1960  MRN: [id number]  Today's Date: 2022      Visit Date: 2022     Attendance: 1 visits  Subjective improvement: N/A  MD appt: 3/1/2022  Recheck due: 2022      Patient Active Problem List   Diagnosis   • Chronic pain disorder   • Controlled substance agreement signed   • Degeneration of cervical disc without myelopathy   • Esophageal reflux   • Essential hypertension   • Hip pain, bilateral   • Hyperlipidemia   • Lumbar spinal stenosis   • Morbid obesity with BMI of 40.0-44.9, adult (HCC)   • Primary osteoarthritis of right knee   • Serum calcium elevated   • Tobacco use disorder   • Vitamin D deficiency        Past Medical History:   Diagnosis Date   • Fibromyalgia, primary    • Hyperlipidemia    • Hypertension         Past Surgical History:   Procedure Laterality Date   • ARM NERVE EXPLORATION Bilateral     both elbows   • BACK SURGERY      x2   • EXPLORATORY LAPAROTOMY     • MOUTH SURGERY      x2   • REPLACEMENT TOTAL KNEE Right    • SHOULDER SURGERY Bilateral     exploratory surgery x5   • TUBAL ABDOMINAL LIGATION         Visit Dx:     ICD-10-CM ICD-9-CM   1. Chronic pain disorder  G89.4 338.4   2. Degeneration of cervical disc without myelopathy  M50.30 722.4   3. Spinal stenosis of lumbar region without neurogenic claudication  M48.061 724.02          Patient History     Row Name 22 1000             History    Chief Complaint Difficulty with daily activities; Headache; Joint stiffness; Muscle weakness; Numbness; Pain; Tinglings  -KG      Type of Pain Back pain; Neck pain; Lower Extremity / Leg; Upper Extremity / Arm  -KG      Date Current Problem(s) Began --  Chronic; worsening over past several months  -KG      Brief Description of Current Complaint Pt presents with chronic pain issues mostly in neck, shoulders, hands, low back, & and hips. States she has a lot  "of pain in her low back, L hip, & lateral leg. Currently most of her pain is in hands, shoulders, & hips. Has had 2 back surgeries - fusion. Had a nerve ablation in her neck on the L side in August 2021, which she reports really helped with some of her pain. Has also had injections, which helps. States her neck hurts while driving. Has pain trying to turn head either direction, states she gets “cricks” in her neck. Wears custom orthotics. Pt reports she’s very sedentary. Stays in her bed mostly. Sleeps/lays supine with her legs elevated. Does have a lift chair that she uses as well. Uses rollator for gait outside of home. Uses electric scooter in her home. Lives with her daughter, they just moved from [place] in September 2021. Daughter does all the housework. Pt states she will sometimes cook if she is having a good day but that’s rare. Daughter also assists with her ADL’s.  -KG      Patient/Caregiver Goals Improve mobility; Improve strength; Know what to do to help the symptoms; Relieve pain  \"have more movement with less pain\"  -KG      Hand Dominance right-handed  -KG      What clinical tests have you had for this problem? MRI  -KG      Results of Clinical Tests Not on file  -KG              Pain     Pain Location Back; Leg; Arm  -KG      Pain at Present 8  -KG      Pain at Best 8  -KG      Pain at Worst 10  -KG      Pain Frequency Constant/continuous  -KG      Pain Description Aching; Discomfort  -KG      What Performance Factors Make the Current Problem(s) WORSE? Sitting or standing for short periods. Can't be in one position for too long. Walking. Transfers (sit<>stand, sup<>sit)  -KG      What Performance Factors Make the Current Problem(s) BETTER? Sometimes uses heat/ice. \"nothing really helps\". Position changes  -KG      Is your sleep disturbed? Yes  -KG      What position do you sleep in? Supine  with legs elevated on pillows  -KG              Fall Risk Assessment    Any falls in the past year: No  -KG   "            Services    Are you currently receiving Home Health services No  -KG      Do you plan to receive Home Health services in the near future No  -KG            User Key  (r) = Recorded By, (t) = Taken By, (c) = Cosigned By    Initials Name Provider Type    Gabrielle Zafar, PT Physical Therapist                 PT Ortho     Row Name 01/12/22 1000       Precautions and Contraindications    Precautions Hx of lumbar fusion, chronic pain  -KG       Subjective Pain    Pre-Treatment Pain Level 8  -KG    Post-Treatment Pain Level 8  -KG       Posture/Observations    Posture/Observations Comments Decreased/poor overall postural awareness. Guarded with gait and transfers. Rounded shoulders, forward head. Supine L IC and ASIS superior vs R. Fwd flexed at hips with static stance & gait  -KG       Quarter Clearing    Quarter Clearing Upper Quarter Clearing; Lower Quarter Clearing  -KG       Sensory Screen for Light Touch- Upper Quarter Clearing    C4 (posterior shoulder) Bilateral:; Intact  -KG    C5 (lateral upper arm) Bilateral:; Intact  -KG    C6 (tip of thumb) Bilateral:; Intact  -KG    C7 (tip of 3rd finger) Bilateral:; Intact  -KG    C8 (tip of 5th finger) Bilateral:; Intact  -KG    T1 (medial lower arm) Bilateral:; Intact  -KG       Neural Tension Signs- Lower Quarter Clearing    Slump Bilateral:; Negative  -KG    SLR Bilateral:; Negative  -KG       Sensory Screen for Light Touch- Lower Quarter Clearing    L1 (inguinal area) Bilateral:; Intact  -KG    L2 (anterior mid thigh) Bilateral:; Intact  -KG    L3 (distal anterior thigh) Bilateral:; Intact  -KG    L4 (medial lower leg/foot) Bilateral:; Intact  -KG    L5 (lateral lower leg/great toe) Bilateral:; Intact  -KG    S1 (bottom of foot) Bilateral:; Intact  -KG       SI/Hip Screen- Lower Quarter Clearing    ASIS compression Negative  -KG    ASIS distraction Negative  -KG    Rex's/Andrew's test Right:; Negative; Left:; Positive  -KG    Posterior thigh sheer  "Negative  -KG       Special Tests/Palpation    Special Tests/Palpation Cervical/Thoracic; Lumbar/SI  -KG       Cervical Palpation    Cervical Palpation- Location? Suboccipital; Upper traps; Levator scapula; Cervical facets  -KG    Suboccipital Bilateral:; Tender; Guarded/taut  -KG    Cervical Facets Left:; Tender  -KG    Levator Scapula Bilateral:; Tender; Guarded/taut  -KG    Upper Traps Bilateral:; Tender; Guarded/taut  -KG       Cervical/Thoracic Special Tests    Spurlings (Foraminal Compression) Negative  -KG    Cervical Compression (Forarminal Compression vs. Facet Pain) Negative  -KG    Cervical Distraction (Foraminal Compression vs. Facet Pain) Negative  -KG       Lumbosacral Palpation    Lumbosacral Palpation? Yes  -KG    Gluteus Marlo Left:; Tender; Guarded/taut  -KG    Erector Spinae (Paraspinals) Bilateral:; Tender; Guarded/taut  -KG    Lumbosacral Palpation Comments TTP at L lateral hip/quad and glutes  -KG       General ROM    Head/Neck/Trunk Neck Extension; Neck Flexion; Neck Lt Lateral Flexion; Neck Rt Lateral Flexion; Neck Lt Rotation; Neck Rt Rotation; Trunk Extension; Trunk Flexion; Trunk Lt Lateral Flexion; Trunk Rt Lateral Flexion; Trunk Lt Rotation; Trunk Rt Rotation  -KG    GENERAL ROM COMMENTS BUE AROM WFL at shoulder, elbow. Silvio hip, knee, ankle AROM WFL. Pain at low back/L post hip with passive L hip flex and ER.  -KG       Head/Neck/Trunk    Neck Extension AROM 45 deg  -KG    Neck Flexion AROM 55 deg  -KG    Neck Lt Lateral Flexion AROM 20 deg  -KG    Neck Rt Lateral Flexion AROM 18 deg  -KG    Neck Lt Rotation AROM 48 deg  -KG    Neck Rt Rotation AROM 50 deg  -KG    Trunk Extension AROM To neutral with increased pain  -KG    Trunk Flexion AROM Fingertips 2\" above knee jt line  -KG    Trunk Lt Lateral Flexion AROM 25% full range  -KG    Trunk Rt Lateral Flexion AROM 50% full range  -KG       MMT (Manual Muscle Testing)    Rt Upper Ext Rt Shoulder Flexion; Rt Shoulder ABduction; Rt Elbow " Flexion; Rt Elbow Extension  -KG    Lt Upper Ext Lt Shoulder Flexion; Lt Shoulder ABduction; Lt Elbow Extension; Lt Elbow Flexion  -KG    Rt Lower Ext Rt Hip Flexion; Rt Hip ABduction; Rt Knee Extension; Rt Knee Flexion; Rt Ankle Plantarflexion; Rt Ankle Dorsiflexion  -KG    Lt Lower Ext Lt Hip Flexion; Lt Hip ABduction; Lt Knee Extension; Lt Knee Flexion; Lt Ankle Plantarflexion; Lt Ankle Dorsiflexion  -KG       MMT Right Upper Ext    Rt Shoulder Flexion MMT, Gross Movement (4+/5) good plus  -KG    Rt Shoulder ABduction MMT, Gross Movement (4+/5) good plus  -KG    Rt Elbow Flexion MMT, Gross Movement: (5/5) normal  -KG    Rt Elbow Extension MMT, Gross Movement: (5/5) normal  -KG       MMT Left Upper Ext    Lt Shoulder Flexion MMT, Gross Movement (4/5) good  -KG    Lt Shoulder ABduction MMT, Gross Movement (4/5) good  -KG    Lt Elbow Flexion MMT, Gross Movement (5/5) normal  -KG    Lt Elbow Extension MMT, Gross Movement (5/5) normal  -KG       MMT Right Lower Ext    Rt Hip Flexion MMT, Gross Movement (4+/5) good plus  -KG    Rt Hip ABduction MMT, Gross Movement (4/5) good  -KG    Rt Knee Extension MMT, Gross Movement (5/5) normal  -KG    Rt Knee Flexion MMT, Gross Movement (5/5) normal  -KG    Rt Ankle Plantarflexion MMT, Gross Movement (5/5) normal  -KG    Rt Ankle Dorsiflexion MMT, Gross Movement (5/5) normal  -KG       MMT Left Lower Ext    Lt Hip Flexion MMT, Gross Movement (4-/5) good minus  -KG    Lt Hip ABduction MMT, Gross Movement (4-/5) good minus  -KG    Lt Knee Extension MMT, Gross Movement (4+/5) good plus  -KG    Lt Knee Flexion MMT, Gross Movement (4+/5) good plus  -KG    Lt Ankle Plantarflexion MMT, Gross Movement (5/5) normal  -KG    Lt Ankle Dorsiflexion MMT, Gross Movement (5/5) normal  -KG       Sensation    Sensation WNL? WFL  -KG    Light Touch No apparent deficits  -KG       Flexibility    Flexibility Tested? Lower Extremity; Upper Extremity  -KG       Upper Extremity Flexibility    Upper  Trapezius Bilateral:; Moderately limited  -KG    Pect Minor Bilateral:; Moderately limited  -KG    Pect Major Bilateral:; Moderately limited  -KG       Lower Extremity Flexibility    Hamstrings Bilateral:; Moderately limited  L>R  -KG    Hip Flexors Bilateral:; Moderately limited  -KG    Hip External Rotators Moderately limited  -KG    Hip Internal Rotators Mildly limited  -KG       Transfers    Comment (Transfers) Guarded with transfers. Good log roll for sup<>sit  -KG       Gait/Stairs (Locomotion)    Comment (Gait/Stairs) Ambulates with rollator. Fwd flexed at hips. Decreased cesar. Limited knee flex during swing phase. Decreased stance time on L. Decreased heel strike bilaterally  -KG          User Key  (r) = Recorded By, (t) = Taken By, (c) = Cosigned By    Initials Name Provider Type    KG Gabrielle Zapata, PT Physical Therapist                            Therapy Education  Education Details: HEP: see exercise flowsheet for details  Given: HEP, Symptoms/condition management, Pain management, Posture/body mechanics  Program: New  How Provided: Verbal, Demonstration, Written  Provided to: Patient  Level of Understanding: Teach back education performed, Verbalized, Demonstrated      PT OP Goals     Row Name 01/12/22 1000          PT Short Term Goals    STG Date to Achieve 02/02/22  -KG     STG 1 Demonstrate independence/compliance in performance of initial HEP  -KG     STG 2 Demonstrate independence in performance of isometric TA contraction during therex activities for functional carryover into daily activity performance  -KG     STG 3 Demonstrate improved seated PN/postural positioning with seated core/postural stabilization activities with minimal cues required for correction  -KG     STG 4 Demosntrate improved lumbar flex AROM fingertips to mid shin  -KG     STG 5 --  -KG            Long Term Goals    LTG Date to Achieve 02/23/22  -KG     LTG 1 Improved modified owestry score 50% or less  -KG     LTG 2  Demonstrate improved makenzie hip abd/flex MMT to 4+/5  -KG     LTG 3 Tolerate 10-15 minutes of standing functional stabilization/strengthening activities without increased pain  -KG     LTG 4 Demonstrate improved bilateral cervical rotation AROM to 60 deg  -KG     LTG 5 Subjectively report ability to walk more inside her home with RW without use of electric scooter  -KG     LTG 6 Demonstrate independence/understanding in final HEP for indep management  -KG            Time Calculation    PT Goal Re-Cert Due Date 02/02/22  -KG           User Key  (r) = Recorded By, (t) = Taken By, (c) = Cosigned By    Initials Name Provider Type    KG Gabrielle Zapata, PT Physical Therapist                 PT Assessment/Plan     Row Name 01/12/22 1000          PT Assessment    Functional Limitations Decreased safety during functional activities; Impaired locomotion; Limitation in home management; Limitations in community activities; Limitations in functional capacity and performance; Performance in leisure activities; Performance in self-care ADL  -KG     Impairments Balance; Gait; Impaired flexibility; Impaired muscle endurance; Muscle strength; Pain; Poor body mechanics; Posture; Range of motion  -KG     Assessment Comments Pt is a 61 y.o. female presenting with generalized chronic pain, brenda at neck and low back pain that is limiting performance of functional mobility and daily activities. History of lumbar fusion surgery. Pt demonstrates negative neurotension signs and is quite limited in both cervical & trunk ROM. Pt reports being very sedentary due to her pain. Notable weakness in hips L>R, core, and postural stabilizers. Pt will benefit from skilled PT services to address these deficits for improvements in functional strength/stability, core/postural stability, functional mobility, & tolerance to daily activities.  -KG     Please refer to paper survey for additional self-reported information Yes  -KG     Rehab Potential Fair   "Barriers: Chronicity of current condition  -KG     Patient/caregiver participated in establishment of treatment plan and goals Yes  -KG     Patient would benefit from skilled therapy intervention Yes  -KG            PT Plan    PT Frequency 2x/week  -KG     Predicted Duration of Therapy Intervention (PT) 12 visits  -KG     Planned CPT's? PT EVAL MOD COMPLELITY: 43619; PT THER PROC EA 15 MIN: 93053; PT THER ACT EA 15 MIN: 09752; PT MANUAL THERAPY EA 15 MIN: 57992; PT NEUROMUSC RE-EDUCATION EA 15 MIN: 96876; PT SELF CARE/HOME MGMT/TRAIN EA 15: 81856; PT THER SUPP EA 15 MIN  -KG     Physical Therapy Interventions (Optional Details) lumbar stabilization; manual therapy techniques; modalities; neuromuscular re-education; patient/family education; postural re-education; ROM (Range of Motion); strengthening; stretching; home exercise program; balance training  -KG     PT Plan Comments Work on overall BLE/UE strength. Core/postural stability and endurance. Cervical ROM. LE stretching. Progress as tolerated.  -KG           User Key  (r) = Recorded By, (t) = Taken By, (c) = Cosigned By    Initials Name Provider Type    KG Gabrielle Zapata, PT Physical Therapist                   OP Exercises     Row Name 01/12/22 1000             Subjective Comments    Subjective Comments Refer to therapy pt history for details  -KG              Subjective Pain    Able to rate subjective pain? yes  -KG      Pre-Treatment Pain Level 8  -KG      Post-Treatment Pain Level 8  -KG              Exercise 1    Exercise Name 1 Gentle LTR within available range  -KG      Sets 1 1  -KG      Reps 1 5  -KG      Time 1 5\" hold  -KG              Exercise 2    Exercise Name 2 HL PPT  -KG      Sets 2 1  -KG      Reps 2 10  -KG      Time 2 3-5\" hold  -KG              Exercise 3    Exercise Name 3 Supine chin tucks  -KG      Sets 3 1  -KG      Reps 3 10  -KG              Exercise 4    Exercise Name 4 Seated makenzie hamstring stretch  -KG      Reps 4 1  -KG      " "Time 4 30\" hold  -KG      Additional Comments foot on step stool  -KG            User Key  (r) = Recorded By, (t) = Taken By, (c) = Cosigned By    Initials Name Provider Type    LORETO Gabrielle Zapata, PT Physical Therapist              Manual Rx (last 36 hours)     Manual Treatments     Row Name 01/12/22 1000             Manual Rx 1    Manual Rx 1 Location Cervical spine  -KG      Manual Rx 1 Type Gentle distraction, mobility assessment/passive mobility  -KG              Manual Rx 2    Manual Rx 2 Location Silvio UT, sub-occipitals, cervical paraspinals  -KG      Manual Rx 2 Type STM/MFR  -KG            User Key  (r) = Recorded By, (t) = Taken By, (c) = Cosigned By    Initials Name Provider Type    LORETO Gabrielle Zapata, PT Physical Therapist                            Outcome Measure Options: Neck Disability Index (NDI), Jose Jordan  Modified Oswestry  Modified Oswestry Score/Comments: 31 = 62%  Neck Disability Index  Section 1 - Pain Intensity: The pain is fairly severe at the moment.  Section 2 - Personal Care: I need help every day in most aspects of self-care.  Section 3 - Lifting: Pain prevents me from lifting heavy weights, but I can manage light weights if they are conveniently positioned.  Section 4 - Work: I can't do any work at all.  Section 5 - Headaches: I have slight headaches that come infrequently.  Section 6 - Concentration: I have a fair degree of difficulty concentrating.  Section 7 - Sleeping: My sleep is moderately disturbed for up to 2-3 hours.  Section 8 - Driving: I can't drive as long as I want because of moderate neck pain.  Section 9 - Reading: I can't read as much as I want because of moderate neck pain.  Section 10 - Recreation: I can hardly do recreational activities due to neck pain.  Neck Disability Index Score: 31  Neck Disability Index Comments: 31 = 62%      Time Calculation:     Start Time: 1038  Stop Time: 1121  Time Calculation (min): 43 min  Total Timed Code Minutes- PT: 0 " minute(s)     Therapy Charges for Today     Code Description Service Date Service Provider Modifiers Qty    [id number] HC PT EVAL MOD COMPLEXITY 3 1/12/2022 Gabrielle Zapata, PT GP 1          PT G-Codes  Outcome Measure Options: Neck Disability Index (NDI), Joes Jordan  Modified Oswestry Score/Comments: 31 = 62%  Neck Disability Index Score: 31         Gabrielle Zapata, PT  1/12/2022

## 2022-01-14 ENCOUNTER — TELEPHONE (OUTPATIENT)
Dept: FAMILY MEDICINE CLINIC | Facility: CLINIC | Age: 62
End: 2022-01-14

## 2022-01-14 ENCOUNTER — HOSPITAL ENCOUNTER (OUTPATIENT)
Dept: PHYSICAL THERAPY | Facility: HOSPITAL | Age: 62
Setting detail: THERAPIES SERIES
Discharge: HOME | End: 2022-01-14

## 2022-01-14 DIAGNOSIS — M48.061 SPINAL STENOSIS OF LUMBAR REGION WITHOUT NEUROGENIC CLAUDICATION: ICD-10-CM

## 2022-01-14 DIAGNOSIS — M50.30 DEGENERATION OF CERVICAL DISC WITHOUT MYELOPATHY: ICD-10-CM

## 2022-01-14 DIAGNOSIS — G89.4 CHRONIC PAIN DISORDER: Primary | ICD-10-CM

## 2022-01-14 PROCEDURE — 97110 THERAPEUTIC EXERCISES: CPT

## 2022-01-14 NOTE — TELEPHONE ENCOUNTER
Patient stated she got a paper in the mail that said her insurance approved her pantoprazole and wondered what she should do with that information. Tried to get more information from her, but she didn't have the paper with her at time of call. Let patient know to call her pharmacy to check on them having it filled.  Patient made unfriendly comments about not understanding how we do things and how her medications, specifically albuterol wasn't available for her to  so she was almost out, etc. Let patient know that her albuterol is at [facility]'s for pick-up.

## 2022-01-14 NOTE — THERAPY TREATMENT NOTE
"    Outpatient Physical Therapy Ortho Treatment Note  [facility]     Patient Name: Norah Urbina  : 1960  MRN: [id number]  Today's Date: 2022     Pt seen for 2 PT sessions  Reported Improvement:  n/a %  MD Visit: 2022  Recheck Date: 2022    Therapy Diagnosis:  Chronic Pain Disorder        Visit Date: 2022    Visit Dx:    ICD-10-CM ICD-9-CM   1. Chronic pain disorder  G89.4 338.4   2. Degeneration of cervical disc without myelopathy  M50.30 722.4   3. Spinal stenosis of lumbar region without neurogenic claudication  M48.061 724.02       Patient Active Problem List   Diagnosis   • Chronic pain disorder   • Controlled substance agreement signed   • Degeneration of cervical disc without myelopathy   • Esophageal reflux   • Essential hypertension   • Hip pain, bilateral   • Hyperlipidemia   • Lumbar spinal stenosis   • Morbid obesity with BMI of 40.0-44.9, adult (HCC)   • Primary osteoarthritis of right knee   • Serum calcium elevated   • Tobacco use disorder   • Vitamin D deficiency        Past Medical History:   Diagnosis Date   • Fibromyalgia, primary    • Hyperlipidemia    • Hypertension         Past Surgical History:   Procedure Laterality Date   • ARM NERVE EXPLORATION Bilateral     both elbows   • BACK SURGERY      x2   • EXPLORATORY LAPAROTOMY     • MOUTH SURGERY      x2   • REPLACEMENT TOTAL KNEE Right    • SHOULDER SURGERY Bilateral     exploratory surgery x5   • TUBAL ABDOMINAL LIGATION          PT Ortho     Row Name 22 1000       Subjective Comments    Subjective Comments Pt states she is very sore.  States she is only able to do PT visit 1x wk .  -JW       Precautions and Contraindications    Precautions Hx of lumbar fusion, chronic pain  -JW       Subjective Pain    Able to rate subjective pain? yes  -JW    Pre-Treatment Pain Level 8  -JW    Post-Treatment Pain Level 10  -JW    Subjective Pain Comment --  reviewed pain scale - pt staes a 10 is tolerable for \"her\" " "defers ambulance  -JW          User Key  (r) = Recorded By, (t) = Taken By, (c) = Cosigned By    Initials Name Provider Type    Drea Dalal PTA Physical Therapy Assistant                             PT Assessment/Plan     Row Name 01/14/22 1000          PT Assessment    Assessment Comments Pt gives good effort despite visible pinful reposnsed to changing positions in clinics and stretches.  Pt has long history with PT interventions with good recall of previous instructions.  Pt educated on importance of daily performance of good quality for HEP strengthening and stretches.  Pt states she is only able to make 1 x a week PT visits.  -            PT Plan    PT Frequency 2x/week; 1x/week  -JW     PT Plan Comments next visit add sit to stands.  -           User Key  (r) = Recorded By, (t) = Taken By, (c) = Cosigned By    Initials Name Provider Type    Drea Dalal PTA Physical Therapy Assistant                   OP Exercises     Row Name 01/14/22 1000             Subjective Comments    Subjective Comments Pt states she is very sore.  States she is only able to do PT visit 1x wk .  -              Subjective Pain    Able to rate subjective pain? yes  -      Pre-Treatment Pain Level 8  -JW      Post-Treatment Pain Level 10  -JW      Subjective Pain Comment --  reviewed pain scale - pt staes a 10 is tolerable for \"her\" defers ambulance  -JW              Exercise 1    Exercise Name 1 Pro II LE UE/LE bike for ROM, posturing, endurance  -JW      Time 1 10 min  -JW      Additional Comments L 3  -JW              Exercise 2    Exercise Name 2 B UT st  -JW      Reps 2 2  -JW      Time 2 30  -JW      Additional Comments hand at bottom of seat  -JW              Exercise 3    Exercise Name 3 B LS st  -JW      Reps 3 2  -JW      Time 3 30  -JW      Additional Comments hand at bottom of seat  -JW              Exercise 4    Exercise Name 4 Post Shoulder rolls  -JW      Cueing 4 Verbal; Demo  -JW      Sets 4 2  -JW   "    Reps 4 5  -JW      Additional Comments between UE stretches  -JW              Exercise 5    Exercise Name 5 Cspine AROM rotation  -JW      Cueing 5 Verbal; Demo  -JW      Sets 5 1  -JW      Reps 5 10  -JW              Exercise 6    Exercise Name 6 Cspine Arom flex/ext  -JW      Cueing 6 Verbal; Demo  -JW      Sets 6 1  -JW      Reps 6 10  -JW              Exercise 7    Exercise Name 7 Cspine AROM SB  -JW      Cueing 7 Verbal; Demo  -JW      Sets 7 1  -JW      Reps 7 10  -JW              Exercise 8    Exercise Name 8 seated B HS st  -JW      Reps 8 2  -JW      Time 8 30  -JW              Exercise 9    Exercise Name 9 verbal review of previous HEP  -JW            User Key  (r) = Recorded By, (t) = Taken By, (c) = Cosigned By    Initials Name Provider Type    Drea Dalal PTA Physical Therapy Assistant                              PT OP Goals     Row Name 01/14/22 1000          PT Short Term Goals    STG Date to Achieve 02/02/22  -     STG 1 Demonstrate independence/compliance in performance of initial HEP  -JW     STG 2 Demonstrate independence in performance of isometric TA contraction during therex activities for functional carryover into daily activity performance  -     STG 3 Demonstrate improved seated PN/postural positioning with seated core/postural stabilization activities with minimal cues required for correction  -     STG 4 Demosntrate improved lumbar flex AROM fingertips to mid zacarias  -            Long Term Goals    LTG Date to Achieve 02/23/22  -     LTG 1 Improved modified owestry score 50% or less  -     LTG 2 Demonstrate improved makenzie hip abd/flex MMT to 4+/5  -     LTG 3 Tolerate 10-15 minutes of standing functional stabilization/strengthening activities without increased pain  -     LTG 4 Demonstrate improved bilateral cervical rotation AROM to 60 deg  -     LTG 5 Subjectively report ability to walk more inside her home with RW without use of electric scooter  -     LTG 6  Demonstrate independence/understanding in final HEP for indep management  -KAYKAY            Time Calculation    PT Goal Re-Cert Due Date 02/02/22  -KAYKAY           User Key  (r) = Recorded By, (t) = Taken By, (c) = Cosigned By    Initials Name Provider Type    Drea Dalal PTA Physical Therapy Assistant                Therapy Education  Education Details: Cspine UE stretches and AROM  Given: HEP, Posture/body mechanics, Symptoms/condition management  Program: New, Reinforced  How Provided: Written, Demonstration, Verbal  Provided to: Patient  Level of Understanding: Teach back education performed, Verbalized, Demonstrated              Time Calculation:   Start Time: 1000  Stop Time: 1044  Time Calculation (min): 44 min  Therapy Charges for Today     Code Description Service Date Service Provider Modifiers Qty    08031697044 HC PT THER PROC EA 15 MIN 1/14/2022 Drea Wise PTA GP, CQ 3    45056696220 HC PT THER SUPP EA 15 MIN 1/14/2022 Drea Wise PTA GP, CQ 1                    Drea Wise PTA  1/14/2022

## 2022-01-17 DIAGNOSIS — K21.9 GASTROESOPHAGEAL REFLUX DISEASE WITHOUT ESOPHAGITIS: ICD-10-CM

## 2022-01-17 RX ORDER — PANTOPRAZOLE SODIUM 40 MG/1
40 TABLET, DELAYED RELEASE ORAL 2 TIMES DAILY
Qty: 90 TABLET | Refills: 0 | Status: SHIPPED | OUTPATIENT
Start: 2022-01-17 | End: 2022-04-26

## 2022-01-17 NOTE — TELEPHONE ENCOUNTER
Patient called and stated her albuterol solution is needing a PA and also wanted to let us know her Protonix has been approved.

## 2022-01-18 ENCOUNTER — HOSPITAL ENCOUNTER (OUTPATIENT)
Dept: PHYSICAL THERAPY | Facility: HOSPITAL | Age: 62
Setting detail: THERAPIES SERIES
Discharge: HOME | End: 2022-01-18

## 2022-01-18 DIAGNOSIS — M48.061 SPINAL STENOSIS OF LUMBAR REGION WITHOUT NEUROGENIC CLAUDICATION: ICD-10-CM

## 2022-01-18 DIAGNOSIS — M50.30 DEGENERATION OF CERVICAL DISC WITHOUT MYELOPATHY: ICD-10-CM

## 2022-01-18 DIAGNOSIS — G89.4 CHRONIC PAIN DISORDER: Primary | ICD-10-CM

## 2022-01-18 PROCEDURE — 97110 THERAPEUTIC EXERCISES: CPT

## 2022-01-18 NOTE — THERAPY TREATMENT NOTE
"    Outpatient Physical Therapy Ortho Treatment Note  [facility]     Patient Name: Norah Urbina  : 1960  MRN: [id number]  Today's Date: 2022     Pt seen for 3 PT sessions  Reported Improvement:  0 %  MD Visit: 2022  Recheck Date: 2022    Therapy Diagnosis:  Chronic Pain Disorder        Visit Date: 2022    Visit Dx:    ICD-10-CM ICD-9-CM   1. Chronic pain disorder  G89.4 338.4   2. Degeneration of cervical disc without myelopathy  M50.30 722.4   3. Spinal stenosis of lumbar region without neurogenic claudication  M48.061 724.02       Patient Active Problem List   Diagnosis   • Chronic pain disorder   • Controlled substance agreement signed   • Degeneration of cervical disc without myelopathy   • Esophageal reflux   • Essential hypertension   • Hip pain, bilateral   • Hyperlipidemia   • Lumbar spinal stenosis   • Morbid obesity with BMI of 40.0-44.9, adult (HCC)   • Primary osteoarthritis of right knee   • Serum calcium elevated   • Tobacco use disorder   • Vitamin D deficiency        Past Medical History:   Diagnosis Date   • Fibromyalgia, primary    • Hyperlipidemia    • Hypertension         Past Surgical History:   Procedure Laterality Date   • ARM NERVE EXPLORATION Bilateral     both elbows   • BACK SURGERY      x2   • EXPLORATORY LAPAROTOMY     • MOUTH SURGERY      x2   • REPLACEMENT TOTAL KNEE Right    • SHOULDER SURGERY Bilateral     exploratory surgery x5   • TUBAL ABDOMINAL LIGATION          PT Ortho     Row Name 22 1000       Subjective Comments    Subjective Comments Pt states the exercises make her hurt.  States she is just having to deal with the cold weather and her pain being worse when it is cold.  -JW       Precautions and Contraindications    Precautions Hx of lumbar fusion, chronic pain  -JW       Subjective Pain    Able to rate subjective pain? yes  -JW    Pre-Treatment Pain Level 10  10+ \"call the ambulance\" pt in no apparent physical distress  -JW       " "   User Key  (r) = Recorded By, (t) = Taken By, (c) = Cosigned By    Initials Name Provider Type    Drea Dalal PTA Physical Therapy Assistant                             PT Assessment/Plan     Row Name 01/18/22 1000          PT Assessment    Assessment Comments Pt more labile this date, tearful during treatment session.  Pt reports having pain in hands, shoulder, neck and B thighs and describes it as a burning pain.  pt observed leaving building safely with rollator.  -JW            PT Plan    PT Frequency 1x/week  -JW     PT Plan Comments progress as tolerated.  -JW           User Key  (r) = Recorded By, (t) = Taken By, (c) = Cosigned By    Initials Name Provider Type    Drea Dalal PTA Physical Therapy Assistant                   OP Exercises     Row Name 01/18/22 1000             Subjective Comments    Subjective Comments Pt states the exercises make her hurt.  States she is just having to deal with the cold weather and her pain being worse when it is cold.  -JW              Subjective Pain    Able to rate subjective pain? yes  -JW      Pre-Treatment Pain Level 10  10+ \"call the ambulance\" pt in no apparent physical distress  -JW              Exercise 1    Exercise Name 1 Pro II LE UE/LE bike for ROM, posturing, endurance  -JW      Time 1 10 min  -JW      Additional Comments L 3.0  -JW              Exercise 2    Exercise Name 2 LTR  -JW      Additional Comments unable to complete due to pain  -JW              Exercise 3    Exercise Name 3 HL hip iso ADD  -JW      Cueing 3 Verbal  -JW      Time 3 2 min 5 sec hold  -JW              Exercise 4    Exercise Name 4 HL Hip ABD Tband  -JW      Time 4 2 min with 5 sec hold  -JW      Additional Comments blue tband  -JW              Exercise 5    Exercise Name 5 Log roll training  -JW              Exercise 6    Exercise Name 6 Cspine AROM rotations  -JW              Exercise 7    Exercise Name 7 Cspine AROM Flex/Ext  -JW      Reps 7 10  -JW              " Exercise 8    Exercise Name 8 Cspine AROM SB  -JW              Exercise 9    Exercise Name 9 Ambulation for distance  -      Additional Comments 100' with 4WW  -JW            User Key  (r) = Recorded By, (t) = Taken By, (c) = Cosigned By    Initials Name Provider Type    Drea Dalal PTA Physical Therapy Assistant                              PT OP Goals     Row Name 01/18/22 1000          Time Calculation    PT Goal Re-Cert Due Date 02/02/22  -           User Key  (r) = Recorded By, (t) = Taken By, (c) = Cosigned By    Initials Name Provider Type    Drea Dalal PTA Physical Therapy Assistant                Therapy Education  Given: HEP, Symptoms/condition management, Pain management, Posture/body mechanics  Program: Reinforced  How Provided: Verbal, Demonstration  Provided to: Patient  Level of Understanding: Verbalized, Teach back education performed              Time Calculation:   Start Time: 1000  Stop Time: 1038  Time Calculation (min): 38 min  Therapy Charges for Today     Code Description Service Date Service Provider Modifiers Qty    18167784557 HC PT THER PROC EA 15 MIN 1/18/2022 Drea Wise PTA GP, CQ 3    54685374082 HC PT THER SUPP EA 15 MIN 1/18/2022 Drea Wise PTA GP, CQ 1                    Drea Wise PTA  1/18/2022

## 2022-01-19 ENCOUNTER — TELEPHONE (OUTPATIENT)
Dept: FAMILY MEDICINE CLINIC | Facility: CLINIC | Age: 62
End: 2022-01-19

## 2022-01-20 ENCOUNTER — APPOINTMENT (OUTPATIENT)
Dept: PHYSICAL THERAPY | Facility: HOSPITAL | Age: 62
End: 2022-01-20

## 2022-01-20 NOTE — TELEPHONE ENCOUNTER
Received a fax back that this doesn't needs a PA because it is a covered benefit. Sent fax to pharmacy.

## 2022-01-25 ENCOUNTER — HOSPITAL ENCOUNTER (OUTPATIENT)
Dept: PHYSICAL THERAPY | Facility: HOSPITAL | Age: 62
Setting detail: THERAPIES SERIES
Discharge: HOME | End: 2022-01-25

## 2022-01-25 DIAGNOSIS — M50.30 DEGENERATION OF CERVICAL DISC WITHOUT MYELOPATHY: ICD-10-CM

## 2022-01-25 DIAGNOSIS — M48.061 SPINAL STENOSIS OF LUMBAR REGION WITHOUT NEUROGENIC CLAUDICATION: ICD-10-CM

## 2022-01-25 DIAGNOSIS — G89.4 CHRONIC PAIN DISORDER: Primary | ICD-10-CM

## 2022-01-25 PROCEDURE — 97110 THERAPEUTIC EXERCISES: CPT

## 2022-01-25 NOTE — THERAPY TREATMENT NOTE
"    Outpatient Physical Therapy Ortho Treatment Note  [facility]     Patient Name: Norah Urbina  : 1960  MRN: [id number]  Today's Date: 2022     Pt seen for 4 PT sessions  Reported Improvement:  0 %  MD Visit: 2022  Recheck Date: 2022    Therapy Diagnosis:  Chronic Pain Disorder        Visit Date: 2022    Visit Dx:    ICD-10-CM ICD-9-CM   1. Chronic pain disorder  G89.4 338.4   2. Degeneration of cervical disc without myelopathy  M50.30 722.4   3. Spinal stenosis of lumbar region without neurogenic claudication  M48.061 724.02       Patient Active Problem List   Diagnosis   • Chronic pain disorder   • Controlled substance agreement signed   • Degeneration of cervical disc without myelopathy   • Esophageal reflux   • Essential hypertension   • Hip pain, bilateral   • Hyperlipidemia   • Lumbar spinal stenosis   • Morbid obesity with BMI of 40.0-44.9, adult (HCC)   • Primary osteoarthritis of right knee   • Serum calcium elevated   • Tobacco use disorder   • Vitamin D deficiency        Past Medical History:   Diagnosis Date   • Fibromyalgia, primary    • Hyperlipidemia    • Hypertension         Past Surgical History:   Procedure Laterality Date   • ARM NERVE EXPLORATION Bilateral     both elbows   • BACK SURGERY      x2   • EXPLORATORY LAPAROTOMY     • MOUTH SURGERY      x2   • REPLACEMENT TOTAL KNEE Right    • SHOULDER SURGERY Bilateral     exploratory surgery x5   • TUBAL ABDOMINAL LIGATION          PT Ortho     Row Name 22 1000       Subjective Comments    Subjective Comments Pt states her pain is \"bone deep\" 10+ every day  -KAYKAY       Precautions and Contraindications    Precautions Hx of lumbar fusion, chronic pain  -KAYKAY       Subjective Pain    Able to rate subjective pain? yes  -KAYKAY          User Key  (r) = Recorded By, (t) = Taken By, (c) = Cosigned By    Initials Name Provider Type    Drea Dalal, XUAN Physical Therapy Assistant                             PT " "Assessment/Plan     Row Name 01/25/22 1000          PT Assessment    Assessment Comments Pt with improved ability to perform bridges/GS this date. Unable to perform LTR due to reports of increased pain in L hip.  Pt states she does not feel she has made any progress with PT interventions this far.  Has had multiple rounds of PT previously.  Pt has fair recall of curretn HEP and is very vague with descriptions fo what is being performed at home. States she purchased exercise bands/tubing and a ball for knee squeezes.  Pt unable to recall proper hold time for stretches but did demonstrate proper form with LS stretch.  -JW            PT Plan    PT Frequency 1x/week  -JW     PT Plan Comments Next vist add standing therex  -JW           User Key  (r) = Recorded By, (t) = Taken By, (c) = Cosigned By    Initials Name Provider Type    Drea Dalal, PTA Physical Therapy Assistant                   OP Exercises     Row Name 01/25/22 1000             Subjective Comments    Subjective Comments Pt states her pain is \"bone deep\" 10+ every day  -JW              Subjective Pain    Able to rate subjective pain? yes  -JW      Pre-Treatment Pain Level 10  Pt in no acute distress, walking, comfortable conversation  -JW              Exercise 1    Exercise Name 1 Pro II LE UE/LE bike for ROM, posturing, endurance  -JW      Time 1 6 min  -JW      Additional Comments L 5.0  -JW              Exercise 2    Exercise Name 2 B hs st seated  -JW      Reps 2 3  -JW      Time 2 30  -JW              Exercise 3    Exercise Name 3 Sit to stands  -JW      Reps 3 10  -JW              Exercise 4    Exercise Name 4 LTR  -JW      Reps 4 2  -JW      Additional Comments increased pain in LLE  -JW              Exercise 5    Exercise Name 5 GS/Bridge  -JW      Cueing 5 Verbal  -JW      Sets 5 2  -JW      Reps 5 10  -JW      Time 5 5\" hold  -JW              Exercise 6    Exercise Name 6 B LS st  -JW      Sets 6 2  -JW      Reps 6 30  -JW              " Exercise 7    Exercise Name 7 Verbal review of c-spine AROM  -              Exercise 8    Exercise Name 8 Discussion of mobility in the home RW vs. Scooter  -            User Key  (r) = Recorded By, (t) = Taken By, (c) = Cosigned By    Initials Name Provider Type    Drea Dalal PTA Physical Therapy Assistant                              PT OP Goals     Row Name 01/25/22 1000          PT Short Term Goals    STG Date to Achieve 02/02/22  -     STG 1 Demonstrate independence/compliance in performance of initial HEP  -     STG 1 Progress Partially Met  -     STG 2 Demonstrate independence in performance of isometric TA contraction during therex activities for functional carryover into daily activity performance  -     STG 3 Demonstrate improved seated PN/postural positioning with seated core/postural stabilization activities with minimal cues required for correction  -     STG 3 Progress Partially Met  -     STG 4 Demosntrate improved lumbar flex AROM fingertips to mid zacarias  -            Long Term Goals    LTG Date to Achieve 02/23/22  -     LTG 1 Improved modified owestry score 50% or less  -     LTG 2 Demonstrate improved makenzie hip abd/flex MMT to 4+/5  -     LTG 3 Tolerate 10-15 minutes of standing functional stabilization/strengthening activities without increased pain  -     LTG 4 Demonstrate improved bilateral cervical rotation AROM to 60 deg  -     LTG 5 Subjectively report ability to walk more inside her home with RW without use of electric scooter  -     LTG 6 Demonstrate independence/understanding in final HEP for indep management  -            Time Calculation    PT Goal Re-Cert Due Date 02/02/22  -           User Key  (r) = Recorded By, (t) = Taken By, (c) = Cosigned By    Initials Name Provider Type    Drea Dalal PTA Physical Therapy Assistant                Therapy Education  Education Details: Bridges / GS  Given: HEP, Posture/body mechanics  Program:  Reinforced  How Provided: Verbal, Demonstration  Provided to: Patient  Level of Understanding: Verbalized, Demonstrated              Time Calculation:   Start Time: 1001  Stop Time: 1049  Time Calculation (min): 48 min  Therapy Charges for Today     Code Description Service Date Service Provider Modifiers Qty    83171338282 HC PT THER PROC EA 15 MIN 1/25/2022 Drea Wise PTA GP, CQ 3    07296373950 HC PT THER SUPP EA 15 MIN 1/25/2022 Drea Wies PTA GP, CQ 1                    Drea Wise PTA  1/25/2022

## 2022-01-27 ENCOUNTER — APPOINTMENT (OUTPATIENT)
Dept: PHYSICAL THERAPY | Facility: HOSPITAL | Age: 62
End: 2022-01-27

## 2022-02-01 ENCOUNTER — TELEPHONE (OUTPATIENT)
Dept: FAMILY MEDICINE CLINIC | Facility: CLINIC | Age: 62
End: 2022-02-01

## 2022-02-01 ENCOUNTER — HOSPITAL ENCOUNTER (OUTPATIENT)
Dept: PHYSICAL THERAPY | Facility: HOSPITAL | Age: 62
Setting detail: THERAPIES SERIES
Discharge: HOME | End: 2022-02-01

## 2022-02-01 DIAGNOSIS — M50.30 DEGENERATION OF CERVICAL DISC WITHOUT MYELOPATHY: ICD-10-CM

## 2022-02-01 DIAGNOSIS — M48.061 SPINAL STENOSIS OF LUMBAR REGION WITHOUT NEUROGENIC CLAUDICATION: ICD-10-CM

## 2022-02-01 DIAGNOSIS — G89.4 CHRONIC PAIN DISORDER: Primary | ICD-10-CM

## 2022-02-01 PROCEDURE — 97110 THERAPEUTIC EXERCISES: CPT | Performed by: PHYSICAL THERAPIST

## 2022-02-01 PROCEDURE — 97530 THERAPEUTIC ACTIVITIES: CPT | Performed by: PHYSICAL THERAPIST

## 2022-02-01 NOTE — THERAPY DISCHARGE NOTE
Outpatient Physical Therapy Ortho Progress Note/Discharge Summary  [facility]     Patient Name: Norah Urbina  : 1960  MRN: [id number]  Today's Date: 2022      Visit Date: 2022     Patient seen for 5 PT sessions.  Patient reports 0% of improvement.  Next MD appt: 2022.  Recertification: N/A    Therapy Diagnosis: Chronic Pain Disorder        Visit Dx:    ICD-10-CM ICD-9-CM   1. Chronic pain disorder  G89.4 338.4   2. Degeneration of cervical disc without myelopathy  M50.30 722.4   3. Spinal stenosis of lumbar region without neurogenic claudication  M48.061 724.02       Patient Active Problem List   Diagnosis   • Chronic pain disorder   • Controlled substance agreement signed   • Degeneration of cervical disc without myelopathy   • Esophageal reflux   • Essential hypertension   • Hip pain, bilateral   • Hyperlipidemia   • Lumbar spinal stenosis   • Morbid obesity with BMI of 40.0-44.9, adult (HCC)   • Primary osteoarthritis of right knee   • Serum calcium elevated   • Tobacco use disorder   • Vitamin D deficiency        Past Medical History:   Diagnosis Date   • Fibromyalgia, primary    • Hyperlipidemia    • Hypertension         Past Surgical History:   Procedure Laterality Date   • ARM NERVE EXPLORATION Bilateral     both elbows   • BACK SURGERY      x2   • EXPLORATORY LAPAROTOMY     • MOUTH SURGERY      x2   • REPLACEMENT TOTAL KNEE Right    • SHOULDER SURGERY Bilateral     exploratory surgery x5   • TUBAL ABDOMINAL LIGATION          PT Ortho     Row Name 22 1300       Subjective Comments    Subjective Comments Patient reports she is needing a letter stating she is permentantly disabled. She rpeorts she hurts all of the time.  -AJ       Precautions and Contraindications    Precautions Hx of lumbar fusion, chronic pain  -AJ       Subjective Pain    Able to rate subjective pain? yes  -AJ    Post-Treatment Pain Level 10  leaves laughing and joking with therapist in no distress   -AJ       Posture/Observations    Alignment Options Forward head; Cervical lordosis; Thoracic kyphosis; Rounded shoulders; Scoliosis; Lumbar lordosis; Iliac crests  -AJ    Cervical Lordosis Mild; Moderate; Increased  -AJ    Thoracic Kyphosis Mild; Moderate; Increased  -AJ    Rounded Shoulders Bilateral:; Mild; Moderate; Increased  -AJ    Scoliosis Normal  -AJ    Lumbar lordosis Normal  -AJ    Iliac crests Bilateral:; Normal  Pelvis is level, no LLD to note.  -AJ       Sensory Screen for Light Touch- Upper Quarter Clearing    C4 (posterior shoulder) Bilateral:; Intact  -AJ    C5 (lateral upper arm) Bilateral:; Intact  -AJ    C6 (tip of thumb) Bilateral:; Intact  -AJ    C7 (tip of 3rd finger) Bilateral:; Intact  -AJ    C8 (tip of 5th finger) Bilateral:; Intact  -AJ    T1 (medial lower arm) Bilateral:; Intact  -AJ       Neural Tension Signs- Lower Quarter Clearing    Slump Bilateral:; Negative  -AJ    SLR Bilateral:; Negative  -AJ       Sensory Screen for Light Touch- Lower Quarter Clearing    L1 (inguinal area) Bilateral:; Intact  -AJ    L2 (anterior mid thigh) Bilateral:; Intact  -AJ    L3 (distal anterior thigh) Bilateral:; Intact  -AJ    L4 (medial lower leg/foot) Bilateral:; Intact  -AJ    L5 (lateral lower leg/great toe) Bilateral:; Intact  -AJ    S1 (bottom of foot) Bilateral:; Intact  -AJ       SI/Hip Screen- Lower Quarter Clearing    ASIS compression Negative  -AJ    ASIS distraction Negative  -AJ    Rex's/Andrew's test Negative  -AJ    Posterior thigh sheer Negative  -AJ       Cervical/Thoracic Special Tests    Spurlings (Foraminal Compression) Negative  -AJ    Cervical Compression (Forarminal Compression vs. Facet Pain) Negative  -AJ    Cervical Distraction (Foraminal Compression vs. Facet Pain) Negative  -AJ       General ROM    GENERAL ROM COMMENTS B UE AROM WFL. B LE WFL, some limitations due to obesity.  -AJ       Head/Neck/Trunk    Neck Extension AROM 58°  -AJ    Neck Flexion AROM 38°  -AJ    Neck Lt  Lateral Flexion AROM 20°  -AJ    Neck Rt Lateral Flexion AROM 35°  -AJ    Neck Lt Rotation AROM 60°  -AJ    Neck Rt Rotation AROM 70°  -AJ    Trunk Extension AROM 15°  -AJ    Trunk Flexion AROM 85°  -AJ    Trunk Lt Lateral Flexion AROM 100% of range, WNL  -AJ    Trunk Rt Lateral Flexion AROM 100% of range, WNL  -AJ    Trunk Lt Rotation AROM 50% of range  -AJ    Trunk Rt Rotation AROM 75% of range  -AJ       MMT (Manual Muscle Testing)    General MMT Comments B UE/LE 4+/5 with giving way  -AJ       Sensation    Sensation WNL? WFL  -AJ    Light Touch No apparent deficits  -AJ       Pathomechanics    Spine Pathomechanics Excessive thoracic kyphosis with forward bend; Limited lumbar flattening with forward bend; Bends knees with attempted lumbar extension  -AJ       Transfers    Comment (Transfers) I with all transfers with rollator. Fair log roll technique.  -AJ       Gait/Stairs (Locomotion)    Comment (Gait/Stairs) Ambulates with rollator. Fwd flexed at hips. Decreased cesar. Limited knee flex during swing phase. Decreased stance time on L. Decreased heel strike bilaterally  -AJ          User Key  (r) = Recorded By, (t) = Taken By, (c) = Cosigned By    Initials Name Provider Type    Marion Busch, PT DPT Physical Therapist                 Barriers to Rehab: Include significant or possible arthritic/degenerative changes that have occurred within the joint/spine, The chronicity of this issue, The patient's obesity, The patient's generally deconditioned state, Possible monetary/secondary gain    Safety Issues: None noted.            PT Assessment/Plan     Row Name 02/01/22 1300          PT Assessment    Functional Limitations Decreased safety during functional activities; Impaired locomotion; Limitation in home management; Limitations in community activities; Limitations in functional capacity and performance; Performance in leisure activities; Performance in self-care ADL; Impaired gait  -AJ      Impairments Balance; Gait; Impaired flexibility; Impaired muscle endurance; Muscle strength; Pain; Poor body mechanics; Posture; Range of motion  -     Assessment Comments Patient has mild improvement sin ROM, but is unable ot tolerate progression in PT and has had multiple bouts of PT over the years. patient also only able ot come in 1x/wk making progresison difficult. She appears to know HEP with only minor cueing occasionally. Patient also has written copies of all HEP exercises. Patient appears in no distress whileperforming all ther ex today and leaves laughing and joking.  -AJ     Rehab Potential Poor  -AJ     Patient/caregiver participated in establishment of treatment plan and goals Yes  -AJ     Patient would benefit from skilled therapy intervention No  -AJ            PT Plan    PT Frequency --  N/A  -     PT Plan Comments D/C today due to lack of progress and patient reports PT worsens symptoms. Also unable to functionally progress beyond basic exercises due to poor overall tolerance.  -           User Key  (r) = Recorded By, (t) = Taken By, (c) = Cosigned By    Initials Name Provider Type    Marion Busch, PT DPT Physical Therapist                     OP Exercises     Row Name 02/01/22 1300             Subjective Comments    Subjective Comments Patient reports she is needing a letter stating she is permentantly disabled. She rpeorts she hurts all of the time.  -              Subjective Pain    Able to rate subjective pain? yes  -AJ      Pre-Treatment Pain Level 10  reports with a smile  -      Post-Treatment Pain Level 10  leaves laughing and joking with therapist in no distress  -              Exercise 1    Exercise Name 1 Pro II LE UE/LE bike for ROM, posturing, endurance  -      Time 1 6 min  -AJ      Additional Comments L 4.0  -              Exercise 2    Exercise Name 2 B seated HS S  -AJ      Reps 2 2  -      Time 2 30 seconds  -              Exercise 3    Exercise  "Name 3 B UT S  -AJ      Reps 3 2  -AJ      Time 3 30 seconds  -AJ              Exercise 4    Exercise Name 4 B LS S  -AJ      Reps 4 2  -AJ      Time 4 30 seconds  -AJ              Exercise 5    Exercise Name 5 measurements  -AJ              Exercise 6    Exercise Name 6 AROM c-spine 6-way  -AJ      Reps 6 10 each  -AJ              Exercise 7    Exercise Name 7 Semi-reclined chin tucks  -AJ      Reps 7 10  -AJ      Time 7 5\" hold  -AJ              Exercise 8    Exercise Name 8 Glut sets  -AJ      Reps 8 20  -AJ      Time 8 5\" hold  -AJ              Exercise 9    Exercise Name 9 HL PPT  -AJ      Reps 9 10  -AJ      Time 9 5\" hold  -AJ              Exercise 10    Exercise Name 10 Verbal review of remaining HEP exercises  -              Exercise 11    Exercise Name 11 Discussion of POC  -            User Key  (r) = Recorded By, (t) = Taken By, (c) = Cosigned By    Initials Name Provider Type    Marion Busch, PT DPT Physical Therapist                                PT OP Goals     Row Name 02/01/22 1300          PT Short Term Goals    STG Date to Achieve 02/02/22  -     STG 1 Demonstrate independence/compliance in performance of initial HEP  -     STG 1 Progress Partially Met  -     STG 2 Demonstrate independence in performance of isometric TA contraction during therex activities for functional carryover into daily activity performance  -     STG 2 Progress Partially Met  -     STG 3 Demonstrate improved seated PN/postural positioning with seated core/postural stabilization activities with minimal cues required for correction  -     STG 3 Progress Partially Met  -     STG 4 Demosntrate improved lumbar flex AROM fingertips to mid shin  -     STG 4 Progress Met  -            Long Term Goals    LTG Date to Achieve 02/23/22  -     LTG 1 Improved modified owestry score 50% or less  -     LTG 1 Progress Not Met  -     LTG 2 Demonstrate improved makenzie hip abd/flex MMT to 4+/5  -     LTG " 3 Tolerate 10-15 minutes of standing functional stabilization/strengthening activities without increased pain  -     LTG 3 Progress Not Met  -     LTG 4 Demonstrate improved bilateral cervical rotation AROM to 60 deg  -     LTG 4 Progress Met  -AJ     LTG 5 Subjectively report ability to walk more inside her home with RW without use of electric scooter  -     LTG 5 Progress Partially Met  -AJ     LTG 6 Demonstrate independence/understanding in final HEP for indep management  -     LTG 6 Progress Met  -            Time Calculation    PT Goal Re-Cert Due Date --  N/A  -           User Key  (r) = Recorded By, (t) = Taken By, (c) = Cosigned By    Initials Name Provider Type    Marion Busch, PT DPT Physical Therapist                Therapy Education  Given: HEP, Posture/body mechanics (POC)  Program: Reinforced  How Provided: Verbal  Provided to: Patient  Level of Understanding: Verbalized, Demonstrated    Outcome Measure Options: Neck Disability Index (NDI), Modbrittny Dumontestrkate  Modified Oswestry  Modified Oswestry Score/Comments: 41/50 = 82% (filled out by daughter)  Neck Disability Index  Section 1 - Pain Intensity: The pain is fairly severe at the moment.  Section 2 - Personal Care: I need help every day in most aspects of self-care.  Section 3 - Lifting: I can lift only very light weights.  Section 4 - Work: I can't do any work at all.  Section 5 - Headaches: I have moderate headaches that come infrequently.  Section 6 - Concentration: I have a lot of difficulty concentrating.  Section 7 - Sleeping: My sleep is greatly disturbed for up to 3-5 hours.  Section 8 - Driving: I can't drive as long as I want because of moderate neck pain.  Section 9 - Reading: I can't read as much as I want because of severe neck pain.  Section 10 - Recreation: I have neck pain with most recreational activities.  Neck Disability Index Score: 35  Neck Disability Index Comments: 35 = 70% (filled out by  daughter)      Time Calculation:   Start Time: 1300  Stop Time: 1346  Time Calculation (min): 46 min  Therapy Charges for Today     Code Description Service Date Service Provider Modifiers Qty    15546331984 HC PT THER SUPP EA 15 MIN 2/1/2022 Marion Daniels, PT DPT GP 1    04248725625 HC PT THERAPEUTIC ACT EA 15 MIN 2/1/2022 Marion Daniels, MANNIE DPT GP 1    [id number]  PT THER PROC EA 15 MIN 2/1/2022 Marion Daniels PT DPT GP 2          PT G-Codes  Outcome Measure Options: Neck Disability Index (NDI), Jose Jordan  Modified Oswestry Score/Comments: 41/50 = 82% (filled out by daughter)  Neck Disability Index Score: 35     OP PT Discharge Summary  Date of Discharge: 02/01/22  Reason for Discharge: Lack of progress  Outcomes Achieved: Patient able to partially acheive established goals, Unable to make functional progress toward goals at this time, Refer to plan of care for updates on goals achieved  Discharge Destination: Home with home program  Discharge Instructions/Additional Comments: Patient encouraged to follow up with referring provider      This document has been electronically signed by Marion Daniels PT BR[place], [facility] on February 1, 2022 15:18 CST

## 2022-02-01 NOTE — TELEPHONE ENCOUNTER
Patient left message asking about the referral for the ride to [place] for her neurology appt on the 7th. PACS is waiting on that paper to be sent back  To them please call patient to let her know if it has been sent

## 2022-02-01 NOTE — TELEPHONE ENCOUNTER
I found the order from PACS. It was just given to me today. I will have Dr Quan sign the order before he leaves this evening.

## 2022-02-07 ENCOUNTER — OFFICE VISIT (OUTPATIENT)
Dept: NEUROSURGERY | Facility: CLINIC | Age: 62
End: 2022-02-07

## 2022-02-07 ENCOUNTER — HOSPITAL ENCOUNTER (OUTPATIENT)
Dept: GENERAL RADIOLOGY | Facility: HOSPITAL | Age: 62
Discharge: HOME | End: 2022-02-07
Admitting: NURSE PRACTITIONER

## 2022-02-07 VITALS
DIASTOLIC BLOOD PRESSURE: 78 MMHG | SYSTOLIC BLOOD PRESSURE: 122 MMHG | HEIGHT: 65 IN | BODY MASS INDEX: 47.65 KG/M2 | WEIGHT: 286 LBS

## 2022-02-07 DIAGNOSIS — M54.2 CERVICALGIA: ICD-10-CM

## 2022-02-07 DIAGNOSIS — M54.2 CERVICALGIA: Primary | ICD-10-CM

## 2022-02-07 DIAGNOSIS — R20.0 NUMBNESS AND TINGLING OF BOTH UPPER EXTREMITIES: ICD-10-CM

## 2022-02-07 DIAGNOSIS — F17.210 CIGARETTE SMOKER: ICD-10-CM

## 2022-02-07 DIAGNOSIS — M54.12 CERVICAL RADICULOPATHY: ICD-10-CM

## 2022-02-07 DIAGNOSIS — E66.01 MORBID OBESITY WITH BMI OF 45.0-49.9, ADULT: ICD-10-CM

## 2022-02-07 DIAGNOSIS — R20.2 NUMBNESS AND TINGLING OF BOTH UPPER EXTREMITIES: ICD-10-CM

## 2022-02-07 PROCEDURE — 72052 X-RAY EXAM NECK SPINE 6/>VWS: CPT

## 2022-02-07 PROCEDURE — 99204 OFFICE O/P NEW MOD 45 MIN: CPT | Performed by: NURSE PRACTITIONER
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
## 2022-02-07 NOTE — PATIENT INSTRUCTIONS
For more information:    Quit Now [place]  1-800-QUIT-NOW  https://[place].quitlogix.org/en-[place]/  Steps to Quit Smoking  Smoking tobacco can be harmful to your health and can affect almost every organ in your body. Smoking puts you, and those around you, at risk for developing many serious chronic diseases. Quitting smoking is difficult, but it is one of the best things that you can do for your health. It is never too late to quit.  What are the benefits of quitting smoking?  When you quit smoking, you lower your risk of developing serious diseases and conditions, such as:  · Lung cancer or lung disease, such as COPD.  · Heart disease.  · Stroke.  · Heart attack.  · Infertility.  · Osteoporosis and bone fractures.  Additionally, symptoms such as coughing, wheezing, and shortness of breath may get better when you quit. You may also find that you get sick less often because your body is stronger at fighting off colds and infections. If you are pregnant, quitting smoking can help to reduce your chances of having a baby of low birth weight.  How do I get ready to quit?  When you decide to quit smoking, create a plan to make sure that you are successful. Before you quit:  · Pick a date to quit. Set a date within the next two weeks to give you time to prepare.  · Write down the reasons why you are quitting. Keep this list in places where you will see it often, such as on your bathroom mirror or in your car or wallet.  · Identify the people, places, things, and activities that make you want to smoke (triggers) and avoid them. Make sure to take these actions:  ¨ Throw away all cigarettes at home, at work, and in your car.  ¨ Throw away smoking accessories, such as ashtrays and lighters.  ¨ Clean your car and make sure to empty the ashtray.  ¨ Clean your home, including curtains and carpets.  · Tell your family, friends, and coworkers that you are quitting. Support from your loved ones can make quitting easier.  · Talk with  your health care provider about your options for quitting smoking.  · Find out what treatment options are covered by your health insurance.  What strategies can I use to quit smoking?  Talk with your healthcare provider about different strategies to quit smoking. Some strategies include:  · Quitting smoking altogether instead of gradually lessening how much you smoke over a period of time. Research shows that quitting “cold turkey” is more successful than gradually quitting.  · Attending in-person counseling to help you build problem-solving skills. You are more likely to have success in quitting if you attend several counseling sessions. Even short sessions of 10 minutes can be effective.  · Finding resources and support systems that can help you to quit smoking and remain smoke-free after you quit. These resources are most helpful when you use them often. They can include:  ¨ Online chats with a counselor.  ¨ Telephone quitlines.  ¨ Printed self-help materials.  ¨ Support groups or group counseling.  ¨ Text messaging programs.  ¨ Mobile phone applications.  · Taking medicines to help you quit smoking. (If you are pregnant or breastfeeding, talk with your health care provider first.) Some medicines contain nicotine and some do not. Both types of medicines help with cravings, but the medicines that include nicotine help to relieve withdrawal symptoms. Your health care provider may recommend:  ¨ Nicotine patches, gum, or lozenges.  ¨ Nicotine inhalers or sprays.  ¨ Non-nicotine medicine that is taken by mouth.  Talk with your health care provider about combining strategies, such as taking medicines while you are also receiving in-person counseling. Using these two strategies together makes you more likely to succeed in quitting than if you used either strategy on its own.  If you are pregnant or breastfeeding, talk with your health care provider about finding counseling or other support strategies to quit smoking. Do  not take medicine to help you quit smoking unless told to do so by your health care provider.  What things can I do to make it easier to quit?  Quitting smoking might feel overwhelming at first, but there is a lot that you can do to make it easier. Take these important actions:  · Reach out to your family and friends and ask that they support and encourage you during this time. Call telephone quitlines, reach out to support groups, or work with a counselor for support.  · Ask people who smoke to avoid smoking around you.  · Avoid places that trigger you to smoke, such as bars, parties, or smoke-break areas at work.  · Spend time around people who do not smoke.  · Lessen stress in your life, because stress can be a smoking trigger for some people. To lessen stress, try:  ¨ Exercising regularly.  ¨ Deep-breathing exercises.  ¨ Yoga.  ¨ Meditating.  ¨ Performing a body scan. This involves closing your eyes, scanning your body from head to toe, and noticing which parts of your body are particularly tense. Purposefully relax the muscles in those areas.  · Download or purchase mobile phone or tablet apps (applications) that can help you stick to your quit plan by providing reminders, tips, and encouragement. There are many free apps, such as QuitGuide from the CDC (Centers for Disease Control and Prevention). You can find other support for quitting smoking (smoking cessation) through smokefree.gov and other websites.  How will I feel when I quit smoking?  Within the first 24 hours of quitting smoking, you may start to feel some withdrawal symptoms. These symptoms are usually most noticeable 2-3 days after quitting, but they usually do not last beyond 2-3 weeks. Changes or symptoms that you might experience include:  · Mood swings.  · Restlessness, anxiety, or irritation.  · Difficulty concentrating.  · Dizziness.  · Strong cravings for sugary foods in addition to nicotine.  · Mild weight  gain.  · Constipation.  · Nausea.  · Coughing or a sore throat.  · Changes in how your medicines work in your body.  · A depressed mood.  · Difficulty sleeping (insomnia).  After the first 2-3 weeks of quitting, you may start to notice more positive results, such as:  · Improved sense of smell and taste.  · Decreased coughing and sore throat.  · Slower heart rate.  · Lower blood pressure.  · Clearer skin.  · The ability to breathe more easily.  · Fewer sick days.  Quitting smoking is very challenging for most people. Do not get discouraged if you are not successful the first time. Some people need to make many attempts to quit before they achieve long-term success. Do your best to stick to your quit plan, and talk with your health care provider if you have any questions or concerns.  This information is not intended to replace advice given to you by your health care provider. Make sure you discuss any questions you have with your health care provider.  Document Released: 12/12/2002 Document Revised: 08/15/2017 Document Reviewed: 05/03/2016  [employer] Interactive Patient Education © 2017 [employer]      BMI for Adults  What is BMI?  Body mass index (BMI) is a number that is calculated from a person's weight and height. BMI can help estimate how much of a person's weight is composed of fat. BMI does not measure body fat directly. Rather, it is an alternative to procedures that directly measure body fat, which can be difficult and expensive.  BMI can help identify people who may be at higher risk for certain medical problems.  What are BMI measurements used for?  BMI is used as a screening tool to identify possible weight problems. It helps determine whether a person is obese, overweight, a healthy weight, or underweight.  BMI is useful for:  · Identifying a weight problem that may be related to a medical condition or may increase the risk for medical problems.  · Promoting changes, such as changes in diet and  "exercise, to help reach a healthy weight. BMI screening can be repeated to see if these changes are working.  How is BMI calculated?  BMI involves measuring your weight in relation to your height. Both height and weight are measured, and the BMI is calculated from those numbers. This can be done either in English ([place] or metric measurements. Note that charts and online BMI calculators are available to help you find your BMI quickly and easily without having to do these calculations yourself.  To calculate your BMI in English ([place] measurements:    1. Measure your weight in pounds (lb).  2. Multiply the number of pounds by 703.  ? For example, for a person who weighs 180 lb, multiply that number by 703, which equals 126,540.  3. Measure your height in inches. Then multiply that number by itself to get a measurement called \"inches squared.\"  ? For example, for a person who is 70 inches tall, the \"inches squared\" measurement is 70 inches x 70 inches, which equals 4,900 inches squared.  4. Divide the total from step 2 (number of lb x 703) by the total from step 3 (inches squared): 126,540 ÷ 4,900 = 25.8. This is your BMI.    To calculate your BMI in metric measurements:  1. Measure your weight in kilograms (kg).  2. Measure your height in meters (m). Then multiply that number by itself to get a measurement called \"meters squared.\"  ? For example, for a person who is 1.75 m tall, the \"meters squared\" measurement is 1.75 m x 1.75 m, which is equal to 3.1 meters squared.  3. Divide the number of kilograms (your weight) by the meters squared number. In this example: 70 ÷ 3.1 = 22.6. This is your BMI.  What do the results mean?  BMI charts are used to identify whether you are underweight, normal weight, overweight, or obese. The following guidelines will be used:  · Underweight: BMI less than 18.5.  · Normal weight: BMI between 18.5 and 24.9.  · Overweight: BMI between 25 and 29.9.  · Obese: BMI of 30 or above.  Keep " "these notes in mind:  · Weight includes both fat and muscle, so someone with a muscular build, such as an athlete, may have a BMI that is higher than 24.9. In cases like these, BMI is not an accurate measure of body fat.  · To determine if excess body fat is the cause of a BMI of 25 or higher, further assessments may need to be done by a health care provider.  · BMI is usually interpreted in the same way for men and women.  Where to find more information  For more information about BMI, including tools to quickly calculate your BMI, go to these websites:  · Centers for Disease Control and Prevention: www.cdc.gov  · American Heart Association: www.heart.org  · National Heart, Lung, and Blood [place]: www.nhlbi.nih.gov  Summary  · Body mass index (BMI) is a number that is calculated from a person's weight and height.  · BMI may help estimate how much of a person's weight is composed of fat. BMI can help identify those who may be at higher risk for certain medical problems.  · BMI can be measured using English measurements or metric measurements.  · BMI charts are used to identify whether you are underweight, normal weight, overweight, or obese.  This information is not intended to replace advice given to you by your health care provider. Make sure you discuss any questions you have with your health care provider.  Document Revised: 09/09/2020 Document Reviewed: 07/17/2020  [employer] Patient Education © 2021 [employer]      https://www.nhlbi.nih.gov/files/docs/public/heart/dash_brief.pdf\">   DASH Eating Plan  DASH stands for Dietary Approaches to Stop Hypertension. The DASH eating plan is a healthy eating plan that has been shown to:  · Reduce high blood pressure (hypertension).  · Reduce your risk for type 2 diabetes, heart disease, and stroke.  · Help with weight loss.  What are tips for following this plan?  Reading food labels  · Check food labels for the amount of salt (sodium) per serving. Choose foods with " "less than 5 percent of the Daily Value of sodium. Generally, foods with less than 300 milligrams (mg) of sodium per serving fit into this eating plan.  · To find whole grains, look for the word \"whole\" as the first word in the ingredient list.  Shopping  · Buy products labeled as \"low-sodium\" or \"no salt added.\"  · Buy fresh foods. Avoid canned foods and pre-made or frozen meals.  Cooking  · Avoid adding salt when cooking. Use salt-free seasonings or herbs instead of table salt or sea salt. Check with your health care provider or pharmacist before using salt substitutes.  · Do not mcdonald foods. Cook foods using healthy methods such as baking, boiling, grilling, roasting, and broiling instead.  · Cook with heart-healthy oils, such as olive, canola, avocado, soybean, or sunflower oil.  Meal planning    · Eat a balanced diet that includes:  ? 4 or more servings of fruits and 4 or more servings of vegetables each day. Try to fill one-half of your plate with fruits and vegetables.  ? 6-8 servings of whole grains each day.  ? Less than 6 oz (170 g) of lean meat, poultry, or fish each day. A 3-oz (85-g) serving of meat is about the same size as a deck of cards. One egg equals 1 oz (28 g).  ? 2-3 servings of low-fat dairy each day. One serving is 1 cup (237 mL).  ? 1 serving of nuts, seeds, or beans 5 times each week.  ? 2-3 servings of heart-healthy fats. Healthy fats called omega-3 fatty acids are found in foods such as walnuts, flaxseeds, fortified milks, and eggs. These fats are also found in cold-water fish, such as sardines, salmon, and mackerel.  · Limit how much you eat of:  ? Canned or prepackaged foods.  ? Food that is high in trans fat, such as some fried foods.  ? Food that is high in saturated fat, such as fatty meat.  ? Desserts and other sweets, sugary drinks, and other foods with added sugar.  ? Full-fat dairy products.  · Do not salt foods before eating.  · Do not eat more than 4 egg yolks a week.  · Try to " eat at least 2 vegetarian meals a week.  · Eat more home-cooked food and less restaurant, buffet, and fast food.    Lifestyle  · When eating at a restaurant, ask that your food be prepared with less salt or no salt, if possible.  · If you drink alcohol:  ? Limit how much you use to:  § 0-1 drink a day for women who are not pregnant.  § 0-2 drinks a day for men.  ? Be aware of how much alcohol is in your drink. In the [place] one drink equals one 12 oz bottle of beer (355 mL), one 5 oz glass of wine (148 mL), or one 1½ oz glass of hard liquor (44 mL).  General information  · Avoid eating more than 2,300 mg of salt a day. If you have hypertension, you may need to reduce your sodium intake to 1,500 mg a day.  · Work with your health care provider to maintain a healthy body weight or to lose weight. Ask what an ideal weight is for you.  · Get at least 30 minutes of exercise that causes your heart to beat faster (aerobic exercise) most days of the week. Activities may include walking, swimming, or biking.  · Work with your health care provider or dietitian to adjust your eating plan to your individual calorie needs.  What foods should I eat?  Fruits  All fresh, dried, or frozen fruit. Canned fruit in natural juice (without added sugar).  Vegetables  Fresh or frozen vegetables (raw, steamed, roasted, or grilled). Low-sodium or reduced-sodium tomato and vegetable juice. Low-sodium or reduced-sodium tomato sauce and tomato paste. Low-sodium or reduced-sodium canned vegetables.  Grains  Whole-grain or whole-wheat bread. Whole-grain or whole-wheat pasta. Brown rice. Oatmeal. Quinoa. Bulgur. Whole-grain and low-sodium cereals. Renetta bread. Low-fat, low-sodium crackers. Whole-wheat flour tortillas.  Meats and other proteins  Skinless chicken or turkey. Ground chicken or turkey. Pork with fat trimmed off. Fish and seafood. Egg whites. Dried beans, peas, or lentils. Unsalted nuts, nut butters, and seeds. Unsalted canned beans. Lean  cuts of beef with fat trimmed off. Low-sodium, lean precooked or cured meat, such as sausages or meat loaves.  Dairy  Low-fat (1%) or fat-free (skim) milk. Reduced-fat, low-fat, or fat-free cheeses. Nonfat, low-sodium ricotta or cottage cheese. Low-fat or nonfat yogurt. Low-fat, low-sodium cheese.  Fats and oils  Soft margarine without trans fats. Vegetable oil. Reduced-fat, low-fat, or light mayonnaise and salad dressings (reduced-sodium). Canola, safflower, olive, avocado, soybean, and sunflower oils. Avocado.  Seasonings and condiments  Herbs. Spices. Seasoning mixes without salt.  Other foods  Unsalted popcorn and pretzels. Fat-free sweets.  The items listed above may not be a complete list of foods and beverages you can eat. Contact a dietitian for more information.  What foods should I avoid?  Fruits  Canned fruit in a light or heavy syrup. Fried fruit. Fruit in cream or butter sauce.  Vegetables  Creamed or fried vegetables. Vegetables in a cheese sauce. Regular canned vegetables (not low-sodium or reduced-sodium). Regular canned tomato sauce and paste (not low-sodium or reduced-sodium). Regular tomato and vegetable juice (not low-sodium or reduced-sodium). Pickles. Olives.  Grains  Baked goods made with fat, such as croissants, muffins, or some breads. Dry pasta or rice meal packs.  Meats and other proteins  Fatty cuts of meat. Ribs. Fried meat. Castro. Bologna, salami, and other precooked or cured meats, such as sausages or meat loaves. Fat from the back of a pig (fatback). Bratwurst. Salted nuts and seeds. Canned beans with added salt. Canned or smoked fish. Whole eggs or egg yolks. Chicken or turkey with skin.  Dairy  Whole or 2% milk, cream, and half-and-half. Whole or full-fat cream cheese. Whole-fat or sweetened yogurt. Full-fat cheese. Nondairy creamers. Whipped toppings. Processed cheese and cheese spreads.  Fats and oils  Butter. Stick margarine. Lard. Shortening. Ghee. Castro fat. Tropical oils, such  as coconut, palm kernel, or palm oil.  Seasonings and condiments  Onion salt, garlic salt, seasoned salt, table salt, and sea salt. Worcestershire sauce. Tartar sauce. Barbecue sauce. Teriyaki sauce. Soy sauce, including reduced-sodium. Steak sauce. Canned and packaged gravies. Fish sauce. Oyster sauce. Cocktail sauce. Store-bought horseradish. Ketchup. Mustard. Meat flavorings and tenderizers. Bouillon cubes. Hot sauces. Pre-made or packaged marinades. Pre-made or packaged taco seasonings. Relishes. Regular salad dressings.  Other foods  Salted popcorn and pretzels.  The items listed above may not be a complete list of foods and beverages you should avoid. Contact a dietitian for more information.  Where to find more information  · National Heart, Lung, and Blood [place]: www.nhlbi.nih.gov  · American Heart Association: www.heart.org  · Academy of Nutrition and Dietetics: www.eatright.org  · National Kidney Foundation: www.kidney.org  Summary  · The DASH eating plan is a healthy eating plan that has been shown to reduce high blood pressure (hypertension). It may also reduce your risk for type 2 diabetes, heart disease, and stroke.  · When on the DASH eating plan, aim to eat more fresh fruits and vegetables, whole grains, lean proteins, low-fat dairy, and heart-healthy fats.  · With the DASH eating plan, you should limit salt (sodium) intake to 2,300 mg a day. If you have hypertension, you may need to reduce your sodium intake to 1,500 mg a day.  · Work with your health care provider or dietitian to adjust your eating plan to your individual calorie needs.  This information is not intended to replace advice given to you by your health care provider. Make sure you discuss any questions you have with your health care provider.  Document Revised: 11/20/2020 Document Reviewed: 11/20/2020  [employer]vier Patient Education © 2021 [employer]

## 2022-02-08 ENCOUNTER — APPOINTMENT (OUTPATIENT)
Dept: PHYSICAL THERAPY | Facility: HOSPITAL | Age: 62
End: 2022-02-08

## 2022-02-25 ENCOUNTER — TELEPHONE (OUTPATIENT)
Dept: FAMILY MEDICINE CLINIC | Facility: CLINIC | Age: 62
End: 2022-02-25

## 2022-02-28 ENCOUNTER — TELEPHONE (OUTPATIENT)
Dept: FAMILY MEDICINE CLINIC | Facility: CLINIC | Age: 62
End: 2022-02-28

## 2022-02-28 NOTE — TELEPHONE ENCOUNTER
Faxed    Patient called back stating she needed something stating that she cannot drive all the way to [place]. Sent letter and faxed back.

## 2022-02-28 NOTE — TELEPHONE ENCOUNTER
Patient called stated she has a appointment tomorrow in [place]. She hasn't heard anything from [facility]. I told her the papers have been faxed over to [facility]. She is needing a call back please.     She called [facility] and they need that letter faxed over to [phone] Ev Lowery. She said if it gets done early enough the patient can get a ride tomorrow to her appointment.

## 2022-03-01 ENCOUNTER — APPOINTMENT (OUTPATIENT)
Dept: NEUROLOGY | Facility: HOSPITAL | Age: 62
End: 2022-03-01

## 2022-03-11 ENCOUNTER — OFFICE VISIT (OUTPATIENT)
Dept: FAMILY MEDICINE CLINIC | Facility: CLINIC | Age: 62
End: 2022-03-11

## 2022-03-11 VITALS
HEIGHT: 65 IN | OXYGEN SATURATION: 98 % | BODY MASS INDEX: 46.48 KG/M2 | SYSTOLIC BLOOD PRESSURE: 140 MMHG | TEMPERATURE: 96.6 F | WEIGHT: 279 LBS | HEART RATE: 97 BPM | DIASTOLIC BLOOD PRESSURE: 82 MMHG

## 2022-03-11 DIAGNOSIS — R29.818 DISABILITY DUE TO NEUROLOGICAL DISORDER: ICD-10-CM

## 2022-03-11 DIAGNOSIS — M48.02 CERVICAL STENOSIS OF SPINE: Primary | ICD-10-CM

## 2022-03-11 PROCEDURE — 99214 OFFICE O/P EST MOD 30 MIN: CPT | Performed by: STUDENT IN AN ORGANIZED HEALTH CARE EDUCATION/TRAINING PROGRAM

## 2022-03-11 RX ORDER — TRAMADOL HYDROCHLORIDE 50 MG/1
50 TABLET ORAL EVERY 8 HOURS PRN
Qty: 90 TABLET | Refills: 0 | Status: SHIPPED | OUTPATIENT
Start: 2022-03-11 | End: 2022-06-02

## 2022-03-11 NOTE — PROGRESS NOTES
"Subjective:  Norah Urbina is a 61 y.o. female who presents for     Hypertension; currently on atenolol 100 mg a.m., 50 mg p.m., amlodipine 10 mg daily, losartan 25 mg daily.  States that blood pressure has been well controlled.  Denied chest pain, shortness breath, headaches, vision changes, numbness, tingling, weakness.    Cervicalgia; recently seen neurosurgery, instructed to get MRI, EMG studies, unable to obtain secondary to transportation issues.  Recently moved from [place], daughter is primary caretaker, needs disability evaluation and corresponding paperwork filled.  .    Patient Active Problem List   Diagnosis   • Chronic pain disorder   • Controlled substance agreement signed   • Degeneration of cervical disc without myelopathy   • Esophageal reflux   • Essential hypertension   • Hip pain, bilateral   • Hyperlipidemia   • Lumbar spinal stenosis   • Morbid obesity with BMI of 45.0-49.9, adult ([facility])   • Primary osteoarthritis of right knee   • Serum calcium elevated   • Tobacco use disorder   • Vitamin D deficiency   • Cervicalgia   • Cervical radiculopathy   • Numbness and tingling of both upper extremities   • Cigarette smoker     Vitals:    Vitals:    03/11/22 1323   BP: 140/82   BP Location: Right arm   Patient Position: Sitting   Cuff Size: Adult   Pulse: 97   Temp: 96.6 °F (35.9 °C)   SpO2: 98%   Weight: 127 kg (279 lb)   Height: 165.1 cm (65\")     Body mass index is 46.43 kg/m².      Current Outpatient Medications:   •  albuterol (PROVENTIL) (2.5 MG/3ML) 0.083% nebulizer solution, Take 2.5 mg by nebulization Every 4 (Four) Hours As Needed for Wheezing., Disp: 90 mL, Rfl: 3  •  albuterol sulfate  (90 Base) MCG/ACT inhaler, Inhale 2 puffs Every 4 (Four) Hours As Needed for Wheezing., Disp: 18 g, Rfl: 5  •  amLODIPine (NORVASC) 10 MG tablet, Take 1 tablet by mouth Daily., Disp: 90 tablet, Rfl: 1  •  atenolol (TENORMIN) 100 MG tablet, Take 1 tablet by mouth Daily. 100 in AM, Disp: 90 tablet, " Rfl: 1  •  atenolol (TENORMIN) 50 MG tablet, Take 1 tablet by mouth Daily. 50 in PM, Disp: 90 tablet, Rfl: 1  •  baclofen (LIORESAL) 10 MG tablet, Take 1 tablet by mouth 3 (Three) Times a Day As Needed for Muscle Spasms., Disp: 90 tablet, Rfl: 1  •  ibuprofen (ADVIL,MOTRIN) 800 MG tablet, Take 1 tablet by mouth Every 8 (Eight) Hours As Needed for Mild Pain  or Moderate Pain ., Disp: 90 tablet, Rfl: 1  •  losartan (COZAAR) 25 MG tablet, Take 1 tablet by mouth Daily., Disp: 90 tablet, Rfl: 1  •  multivitamin with minerals tablet tablet, Take 1 tablet by mouth Daily. gummy, Disp: , Rfl:   •  pantoprazole (PROTONIX) 40 MG EC tablet, Take 1 tablet by mouth 2 (Two) Times a Day., Disp: 90 tablet, Rfl: 0  •  Probiotic Product (PROBIOTIC PO), Take 1 capsule by mouth Daily., Disp: , Rfl:   •  simvastatin (ZOCOR) 40 MG tablet, Take 1 tablet by mouth Every Night., Disp: 90 tablet, Rfl: 1  •  traMADol (ULTRAM) 50 MG tablet, Take 1 tablet by mouth Every 8 (Eight) Hours As Needed for Moderate Pain ., Disp: 90 tablet, Rfl: 0    Patient Active Problem List   Diagnosis   • Chronic pain disorder   • Controlled substance agreement signed   • Degeneration of cervical disc without myelopathy   • Esophageal reflux   • Essential hypertension   • Hip pain, bilateral   • Hyperlipidemia   • Lumbar spinal stenosis   • Morbid obesity with BMI of 45.0-49.9, adult ([facility])   • Primary osteoarthritis of right knee   • Serum calcium elevated   • Tobacco use disorder   • Vitamin D deficiency   • Cervicalgia   • Cervical radiculopathy   • Numbness and tingling of both upper extremities   • Cigarette smoker     Past Surgical History:   Procedure Laterality Date   • ARM NERVE EXPLORATION Bilateral     both elbows   • BACK SURGERY      X 4    • EXPLORATORY LAPAROTOMY     • MOUTH SURGERY      x2   • REPLACEMENT TOTAL KNEE Right    • SHOULDER SURGERY Bilateral     exploratory surgery x5   • TUBAL ABDOMINAL LIGATION       Social History     Socioeconomic  History   • Marital status: Single   Tobacco Use   • Smoking status: Current Every Day Smoker     Packs/day: 0.50     Types: Cigarettes   • Smokeless tobacco: Never Used   Vaping Use   • Vaping Use: Never used   Substance and Sexual Activity   • Alcohol use: Not Currently   • Drug use: Yes     Types: Marijuana     Comment: once every 2 weeks or so   • Sexual activity: Not Currently     History reviewed. No pertinent family history.  Office Visit on 12/03/2021   Component Date Value Ref Range Status   • Glucose 12/03/2021 102 (A) 65 - 99 mg/dL Final   • BUN 12/03/2021 13  8 - 23 mg/dL Final   • Creatinine 12/03/2021 0.93  0.57 - 1.00 mg/dL Final   • Sodium 12/03/2021 137  136 - 145 mmol/L Final   • Potassium 12/03/2021 4.1  3.5 - 5.2 mmol/L Final   • Chloride 12/03/2021 101  98 - 107 mmol/L Final   • CO2 12/03/2021 26.5  22.0 - 29.0 mmol/L Final   • Calcium 12/03/2021 9.7  8.6 - 10.5 mg/dL Final   • Total Protein 12/03/2021 7.5  6.0 - 8.5 g/dL Final   • Albumin 12/03/2021 4.50  3.50 - 5.20 g/dL Final   • ALT (SGPT) 12/03/2021 22  1 - 33 U/L Final   • AST (SGOT) 12/03/2021 27  1 - 32 U/L Final   • Alkaline Phosphatase 12/03/2021 94  39 - 117 U/L Final   • Total Bilirubin 12/03/2021 0.4  0.0 - 1.2 mg/dL Final   • eGFR   Amer 12/03/2021 74  >60 mL/min/1.73 Final   • Globulin 12/03/2021 3.0  gm/dL Final   • A/G Ratio 12/03/2021 1.5  g/dL Final   • BUN/Creatinine Ratio 12/03/2021 14.0  7.0 - 25.0 Final   • Anion Gap 12/03/2021 9.5  5.0 - 15.0 mmol/L Final   • TSH 12/03/2021 0.944  0.270 - 4.200 uIU/mL Final   • Total Cholesterol 12/03/2021 202 (A) 0 - 200 mg/dL Final   • Triglycerides 12/03/2021 215 (A) 0 - 150 mg/dL Final   • HDL Cholesterol 12/03/2021 55  40 - 60 mg/dL Final   • LDL Cholesterol  12/03/2021 110 (A) 0 - 100 mg/dL Final   • VLDL Cholesterol 12/03/2021 37  5 - 40 mg/dL Final   • LDL/HDL Ratio 12/03/2021 1.89   Final   • Hepatitis C Ab 12/03/2021 <0.1  0.0 - 0.9 s/co ratio Final   • Thyroid Peroxidase  Antibody 12/03/2021 <8  0 - 34 IU/mL Final   • WBC 12/03/2021 8.13  3.40 - 10.80 10*3/mm3 Final   • RBC 12/03/2021 5.48 (A) 3.77 - 5.28 10*6/mm3 Final   • Hemoglobin 12/03/2021 14.4  12.0 - 15.9 g/dL Final   • Hematocrit 12/03/2021 46.5  34.0 - 46.6 % Final   • MCV 12/03/2021 84.9  79.0 - 97.0 fL Final   • MCH 12/03/2021 26.3 (A) 26.6 - 33.0 pg Final   • MCHC 12/03/2021 31.0 (A) 31.5 - 35.7 g/dL Final   • RDW 12/03/2021 14.4  12.3 - 15.4 % Final   • RDW-SD 12/03/2021 44.5  37.0 - 54.0 fl Final   • MPV 12/03/2021 12.3 (A) 6.0 - 12.0 fL Final   • Platelets 12/03/2021 250  140 - 450 10*3/mm3 Final   • Neutrophil % 12/03/2021 64.1  42.7 - 76.0 % Final   • Lymphocyte % 12/03/2021 27.7  19.6 - 45.3 % Final   • Monocyte % 12/03/2021 6.0  5.0 - 12.0 % Final   • Eosinophil % 12/03/2021 1.1  0.3 - 6.2 % Final   • Basophil % 12/03/2021 0.9  0.0 - 1.5 % Final   • Immature Grans % 12/03/2021 0.2  0.0 - 0.5 % Final   • Neutrophils, Absolute 12/03/2021 5.21  1.70 - 7.00 10*3/mm3 Final   • Lymphocytes, Absolute 12/03/2021 2.25  0.70 - 3.10 10*3/mm3 Final   • Monocytes, Absolute 12/03/2021 0.49  0.10 - 0.90 10*3/mm3 Final   • Eosinophils, Absolute 12/03/2021 0.09  0.00 - 0.40 10*3/mm3 Final   • Basophils, Absolute 12/03/2021 0.07  0.00 - 0.20 10*3/mm3 Final   • Immature Grans, Absolute 12/03/2021 0.02  0.00 - 0.05 10*3/mm3 Final   • nRBC 12/03/2021 0.0  0.0 - 0.2 /100 WBC Final   • Color, UA 12/03/2021 Yellow  Yellow, Straw Final   • Appearance, UA 12/03/2021 Clear  Clear Final   • pH, UA 12/03/2021 6.0  5.0 - 8.0 Final   • Specific Gravity, UA 12/03/2021 1.009  1.005 - 1.030 Final   • Glucose, UA 12/03/2021 Negative  Negative Final   • Ketones, UA 12/03/2021 Negative  Negative Final   • Bilirubin, UA 12/03/2021 Negative  Negative Final   • Blood, UA 12/03/2021 Trace (A) Negative Final   • Protein, UA 12/03/2021 Negative  Negative Final   • Leuk Esterase, UA 12/03/2021 Negative  Negative Final   • Nitrite, UA 12/03/2021 Negative   Negative Final   • Urobilinogen, UA 12/03/2021 0.2 E.U./dL  0.2 - 1.0 E.U./dL Final   • RBC, UA 12/03/2021 0-2  None Seen, 0-2 /HPF Final   • WBC, UA 12/03/2021 0-2  None Seen, 0-2 /HPF Final   • Bacteria, UA 12/03/2021 2+ (A) None Seen /HPF Final   • Squamous Epithelial Cells, UA 12/03/2021 3-6 (A) None Seen, 0-2 /HPF Final   • Hyaline Casts, UA 12/03/2021 0-2  None Seen /LPF Final   • Methodology 12/03/2021 Automated Microscopy   Final   • Interpretation 12/03/2021 Comment   Final    Negative  Not infected with HCV, unless recent infection is suspected or other  evidence exists to indicate HCV infection.      XR Spine Cervical Complete With Obli Flex Ext  Narrative: EXAMINATION: Cervical spine complete with obliques with flexion and  extension 2/7/2022     HISTORY: Cervicalgia     FINDINGS: Multiple views of cervical spine including oblique views  demonstrate loss of cervical lordosis. There is degenerative disc  disease at C3-C4, C4-C5, C5-C6 and C6-C7 with prominent posterior  spondylosis at C4-C5, C5-C6 and C6-C7. There is no evidence of  instability with flexion or extension. There is bony neural foraminal  encroachment at multiple levels in the mid and lower cervical spine on  oblique views. There is facet overgrowth within the mid and lower  cervical spine as well as uncovertebral disease at C4-C5, C5-C6 and  C6-C7.     Impression: .  1. Arthritic changes throughout the cervical spine most noted in the mid  and lower cervical column including spondylosis and uncinate spurring  contributing to neural foraminal narrowing. There is loss of cervical  lordosis. No evidence of instability with flexion or extension.  This report was finalized on 02/07/2022 14:07 by Dr. Maco Peguero MD.      [unfilled]  Immunization History   Administered Date(s) Administered   • Tdap 01/11/2018     The following portions of the patient's history were reviewed and updated as appropriate: allergies, current medications, past  family history, past medical history, past social history, past surgical history and problem list.    PHQ-9 Total Score:             Physical Exam  Constitutional:       Appearance: Normal appearance. She is obese.   HENT:      Head: Normocephalic and atraumatic.      Right Ear: External ear normal.      Left Ear: External ear normal.   Eyes:      General:         Right eye: No discharge.         Left eye: No discharge.      Conjunctiva/sclera: Conjunctivae normal.   Cardiovascular:      Rate and Rhythm: Normal rate and regular rhythm.      Pulses: Normal pulses.      Heart sounds: Normal heart sounds. No murmur heard.  Pulmonary:      Effort: Pulmonary effort is normal. No respiratory distress.      Breath sounds: Normal breath sounds.   Abdominal:      General: There is no distension.      Palpations: Abdomen is soft.      Tenderness: There is no abdominal tenderness.   Musculoskeletal:      Cervical back: Normal range of motion.      Right lower leg: No edema.      Left lower leg: No edema.      Comments: Extreme pain with palpation over neck, arm.  Pain out of proportion with exam.   Lymphadenopathy:      Cervical: No cervical adenopathy.   Neurological:      Mental Status: She is alert. Mental status is at baseline.   Psychiatric:         Mood and Affect: Mood normal.         Behavior: Behavior normal.       Assessment/Plan    Diagnosis Plan   1. Cervical stenosis of spine  traMADol (ULTRAM) 50 MG tablet    Urine Drug Screen - Urine, Clean Catch   2. 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  Ambulatory Referral to Occupational Medicine      Orders Placed This Encounter   Procedures   • Urine Drug Screen - Urine, Clean Catch     Order Specific Question:   Release to patient     Answer:   Immediate   • Ambulatory Referral to Occupational Medicine     Referral Priority:   Routine     Referral Type:   Physical Therapy     Referral Reason:   Specialty Services Required     Requested Specialty:   Occupational Medicine      Number of Visits Requested:   1       Patient needs paperwork for her daughter's FMLA since she is primary caretaker, PACs for transportation to MRI, neurosurgery, pain management.  Also needs student loan deferment paperwork.  Still awaiting prior records, UDS today.  Patient states gabapentin, Lyrica do not help with pain, would like Dilaudid.  Will treat with tramadol as above, patient will need UDS prior to refills.  Follow-up in 1 month, sooner if needed.  Consider chronic pain syndrome, fibromyalgia, pain seeking behavior due to hyperalgesia evident on exam.         This document has been electronically signed by Santiago Quan MD on March 11, 2022 14:44 CST    EMR Dragon/Transciption Disclaimer: Some of this note may be an electronic transcription/translation of spoken language to printed text.  The electronic translation of spoken language may permit erroneous, or at times, nonsensical words or phrases to be inadvertently transcribed. Although I have reviewed the note for such errors, some may still exist.

## 2022-03-14 ENCOUNTER — TELEPHONE (OUTPATIENT)
Dept: FAMILY MEDICINE CLINIC | Facility: CLINIC | Age: 62
End: 2022-03-14

## 2022-03-15 DIAGNOSIS — E04.9 THYROID ENLARGEMENT: ICD-10-CM

## 2022-03-17 ENCOUNTER — TELEPHONE (OUTPATIENT)
Dept: FAMILY MEDICINE CLINIC | Facility: CLINIC | Age: 62
End: 2022-03-17

## 2022-03-17 NOTE — TELEPHONE ENCOUNTER
Ms. Urbina called and said that she needs a referral for a ride to [place] for her appointment the 24th this much

## 2022-03-17 NOTE — TELEPHONE ENCOUNTER
Called in stating that she needed a paper sent to the PACS office stating that she can not drive all the way to [place] for her appointment so that they will take her.

## 2022-03-21 ENCOUNTER — TELEPHONE (OUTPATIENT)
Dept: FAMILY MEDICINE CLINIC | Facility: CLINIC | Age: 62
End: 2022-03-21

## 2022-03-21 NOTE — TELEPHONE ENCOUNTER
Patient called to give the fax number for the PACS office, they never received the paper that was sent to them.      FAX:[phone]    Thanks

## 2022-03-22 ENCOUNTER — TELEPHONE (OUTPATIENT)
Dept: FAMILY MEDICINE CLINIC | Facility: CLINIC | Age: 62
End: 2022-03-22

## 2022-03-22 NOTE — TELEPHONE ENCOUNTER
Patient called stating the PACs office received the form, but it's missing the providers signature and a diagnosis.       Please Advise    Thanks

## 2022-03-22 NOTE — TELEPHONE ENCOUNTER
Pt called back and said they still haven't received anything. I told her I would fax it again and to have her call them to make sure that we don't need to fill out anything else for them.    Faxed again

## 2022-03-24 ENCOUNTER — PROCEDURE VISIT (OUTPATIENT)
Dept: PULMONOLOGY | Facility: CLINIC | Age: 62
End: 2022-03-24

## 2022-03-24 DIAGNOSIS — J44.9 CHRONIC OBSTRUCTIVE PULMONARY DISEASE, UNSPECIFIED COPD TYPE: ICD-10-CM

## 2022-03-24 PROCEDURE — 94729 DIFFUSING CAPACITY: CPT | Performed by: INTERNAL MEDICINE

## 2022-03-24 PROCEDURE — 94010 BREATHING CAPACITY TEST: CPT | Performed by: INTERNAL MEDICINE

## 2022-03-24 NOTE — PROGRESS NOTES
BASIC SPIROMETRY WITH DLCO PERFORMED.     FULL PFT WITH BRONCHODILATOR ORDERED, BUT SPIROMETRY WAS WITHIN NORMAL LIMITS, SO NO CLINICAL INDICATION FOR BRONCHODILATOR OR POST SPIROMETRY OR LUNG VOLUMES.     GOOD PATIENT EFFORT AND COOPERATION.     ORDERED BY DR. LOPEZ, READ BY DR. KUMAR

## 2022-03-28 ENCOUNTER — TELEPHONE (OUTPATIENT)
Dept: FAMILY MEDICINE CLINIC | Facility: CLINIC | Age: 62
End: 2022-03-28

## 2022-03-28 NOTE — TELEPHONE ENCOUNTER
Patient would like a call back in regards to her referral to physical therapy. She would like time frame of when they should contact her.  Call back number: [phone]

## 2022-03-29 ENCOUNTER — TELEPHONE (OUTPATIENT)
Dept: FAMILY MEDICINE CLINIC | Facility: CLINIC | Age: 62
End: 2022-03-29

## 2022-03-29 ENCOUNTER — HOSPITAL ENCOUNTER (OUTPATIENT)
Dept: NEUROLOGY | Facility: HOSPITAL | Age: 62
Discharge: HOME | End: 2022-03-29
Admitting: NURSE PRACTITIONER

## 2022-03-29 DIAGNOSIS — R20.0 NUMBNESS AND TINGLING OF BOTH UPPER EXTREMITIES: ICD-10-CM

## 2022-03-29 DIAGNOSIS — M54.2 CERVICALGIA: ICD-10-CM

## 2022-03-29 DIAGNOSIS — R20.2 NUMBNESS AND TINGLING OF BOTH UPPER EXTREMITIES: ICD-10-CM

## 2022-03-29 DIAGNOSIS — M54.12 CERVICAL RADICULOPATHY: ICD-10-CM

## 2022-03-29 PROCEDURE — 95911 NRV CNDJ TEST 9-10 STUDIES: CPT

## 2022-03-29 PROCEDURE — 95886 MUSC TEST DONE W/N TEST COMP: CPT

## 2022-03-29 NOTE — TELEPHONE ENCOUNTER
Patient called asking about appointment to pain management. Said something as well as being sent to PT. She also asked if Kadeem Samayoa had written up something for PACS to take her to [place] for the PT. Please call patient back

## 2022-04-26 DIAGNOSIS — K21.9 GASTROESOPHAGEAL REFLUX DISEASE WITHOUT ESOPHAGITIS: ICD-10-CM

## 2022-04-26 DIAGNOSIS — I10 ESSENTIAL HYPERTENSION: ICD-10-CM

## 2022-04-26 RX ORDER — PANTOPRAZOLE SODIUM 40 MG/1
TABLET, DELAYED RELEASE ORAL
Qty: 90 TABLET | Refills: 0 | Status: SHIPPED | OUTPATIENT
Start: 2022-04-26

## 2022-04-26 RX ORDER — ATENOLOL 100 MG/1
TABLET ORAL
Qty: 90 TABLET | Refills: 0 | Status: SHIPPED | OUTPATIENT
Start: 2022-04-26 | End: 2022-04-28 | Stop reason: SDUPTHER

## 2022-04-26 RX ORDER — ATENOLOL 50 MG/1
TABLET ORAL
Qty: 90 TABLET | Refills: 0 | Status: SHIPPED | OUTPATIENT
Start: 2022-04-26 | End: 2022-04-28 | Stop reason: SDUPTHER

## 2022-04-26 NOTE — TELEPHONE ENCOUNTER
Rx Refill Note  Requested Prescriptions     Pending Prescriptions Disp Refills   • pantoprazole (PROTONIX) 40 MG EC tablet [Pharmacy Med Name: Pantoprazole Sodium 40 MG Oral Tablet Delayed Release] 90 tablet 0     Sig: Take 1 tablet by mouth twice daily   • atenolol (TENORMIN) 50 MG tablet [Pharmacy Med Name: Atenolol 50 MG Oral Tablet] 90 tablet 0     Sig: TAKE 1 TABLET BY MOUTH ONCE DAILY IN THE EVENING   • atenolol (TENORMIN) 100 MG tablet [Pharmacy Med Name: Atenolol 100 MG Oral Tablet] 90 tablet 0     Sig: TAKE 1 TABLET BY MOUTH ONCE DAILY IN THE MORNING      Last office visit with prescribing clinician: 3/11/2022      Next office visit with prescribing clinician: 4/28/2022            Spencer Peña Rep  04/26/22, 08:21 CDT

## 2022-04-28 ENCOUNTER — OFFICE VISIT (OUTPATIENT)
Dept: FAMILY MEDICINE CLINIC | Facility: CLINIC | Age: 62
End: 2022-04-28

## 2022-04-28 VITALS
SYSTOLIC BLOOD PRESSURE: 132 MMHG | HEIGHT: 65 IN | HEART RATE: 73 BPM | OXYGEN SATURATION: 95 % | DIASTOLIC BLOOD PRESSURE: 78 MMHG | BODY MASS INDEX: 48.33 KG/M2 | TEMPERATURE: 97.8 F | WEIGHT: 290.1 LBS

## 2022-04-28 DIAGNOSIS — E78.5 HYPERLIPIDEMIA, UNSPECIFIED HYPERLIPIDEMIA TYPE: ICD-10-CM

## 2022-04-28 DIAGNOSIS — M25.552 LEFT HIP PAIN: Primary | ICD-10-CM

## 2022-04-28 DIAGNOSIS — G89.4 CHRONIC PAIN DISORDER: ICD-10-CM

## 2022-04-28 DIAGNOSIS — I10 ESSENTIAL HYPERTENSION: ICD-10-CM

## 2022-04-28 DIAGNOSIS — K21.9 GASTROESOPHAGEAL REFLUX DISEASE WITHOUT ESOPHAGITIS: ICD-10-CM

## 2022-04-28 DIAGNOSIS — M50.30 DEGENERATION OF CERVICAL DISC WITHOUT MYELOPATHY: ICD-10-CM

## 2022-04-28 DIAGNOSIS — M48.061 SPINAL STENOSIS OF LUMBAR REGION WITHOUT NEUROGENIC CLAUDICATION: ICD-10-CM

## 2022-04-28 PROCEDURE — 99214 OFFICE O/P EST MOD 30 MIN: CPT | Performed by: STUDENT IN AN ORGANIZED HEALTH CARE EDUCATION/TRAINING PROGRAM

## 2022-04-28 RX ORDER — ATENOLOL 100 MG/1
100 TABLET ORAL EVERY MORNING
Qty: 90 TABLET | Refills: 1 | Status: SHIPPED | OUTPATIENT
Start: 2022-04-28

## 2022-04-28 RX ORDER — ATENOLOL 50 MG/1
50 TABLET ORAL EVERY EVENING
Qty: 90 TABLET | Refills: 1 | Status: SHIPPED | OUTPATIENT
Start: 2022-04-28

## 2022-04-28 RX ORDER — LOSARTAN POTASSIUM 25 MG/1
25 TABLET ORAL DAILY
Qty: 90 TABLET | Refills: 1 | Status: SHIPPED | OUTPATIENT
Start: 2022-04-28

## 2022-04-28 RX ORDER — SIMVASTATIN 40 MG
40 TABLET ORAL NIGHTLY
Qty: 90 TABLET | Refills: 1 | Status: SHIPPED | OUTPATIENT
Start: 2022-04-28

## 2022-04-28 RX ORDER — BACLOFEN 10 MG/1
10 TABLET ORAL 3 TIMES DAILY PRN
Qty: 90 TABLET | Refills: 1 | Status: SHIPPED | OUTPATIENT
Start: 2022-04-28

## 2022-04-28 RX ORDER — IBUPROFEN 800 MG/1
800 TABLET ORAL EVERY 8 HOURS PRN
Qty: 90 TABLET | Refills: 1 | Status: SHIPPED | OUTPATIENT
Start: 2022-04-28 | End: 2022-06-02 | Stop reason: SINTOL

## 2022-04-28 RX ORDER — AMLODIPINE BESYLATE 10 MG/1
10 TABLET ORAL DAILY
Qty: 90 TABLET | Refills: 1 | Status: SHIPPED | OUTPATIENT
Start: 2022-04-28

## 2022-04-28 RX ORDER — DULOXETIN HYDROCHLORIDE 30 MG/1
CAPSULE, DELAYED RELEASE ORAL
Qty: 60 CAPSULE | Refills: 0 | Status: SHIPPED | OUTPATIENT
Start: 2022-04-28 | End: 2022-06-01

## 2022-04-28 RX ORDER — PREGABALIN 50 MG/1
50 CAPSULE ORAL 3 TIMES DAILY
Qty: 90 CAPSULE | Refills: 0 | Status: SHIPPED | OUTPATIENT
Start: 2022-04-28 | End: 2022-06-01

## 2022-04-28 NOTE — PROGRESS NOTES
"Subjective:  Norah Urbina is a 61 y.o. female who presents for     Cervicalgia; this patient still has not obtained MRI, did have EMG study which was significant for mild right median nerve compromise at the level of the wrist.  Currently on tramadol 50 mg 3 times daily states that medication makes her too fatigued so she is unable to take it as often as she needs to and that her pain is not well controlled. Has neuropathy of bilateral hands, states it is severe enough to cause her weakness and loss of function of her hands.     Hypertension; currently on atenolol 100 mg a.m., 50 mg p.m., losartan 25 mg daily, amlodipine 10 mg daily.  States blood pressure has been well controlled, denied any symptoms, chest pain, shortness of breath, orthopnea, palpitations, headaches, vision changes, dyspnea on exertion.     Left hip OA; states chronic issue, has had injections in the past.  Reports that pain has been getting progressively worse, difficult to walk, pain is 9/10 in intensity, worse with movement, rotation, walking, better with rest.  Pain radiates to left groin.  Denies any trauma, fevers, chills, swelling, weakness.      Patient Active Problem List   Diagnosis   • Chronic pain disorder   • Controlled substance agreement signed   • Degeneration of cervical disc without myelopathy   • Esophageal reflux   • Essential hypertension   • Hip pain, bilateral   • Hyperlipidemia   • Lumbar spinal stenosis   • Morbid obesity with BMI of 45.0-49.9, adult (HCC)   • Primary osteoarthritis of right knee   • Serum calcium elevated   • Tobacco use disorder   • Vitamin D deficiency   • Cervicalgia   • Cervical radiculopathy   • Numbness and tingling of both upper extremities   • Cigarette smoker     Vitals:    Vitals:    04/28/22 1516   BP: 132/78   BP Location: Right arm   Patient Position: Sitting   Cuff Size: Adult   Pulse: 73   Temp: 97.8 °F (36.6 °C)   SpO2: 95%   Weight: 132 kg (290 lb 1.6 oz)   Height: 165.1 cm (65\") "     Body mass index is 48.28 kg/m².      Current Outpatient Medications:   •  albuterol (PROVENTIL) (2.5 MG/3ML) 0.083% nebulizer solution, Take 2.5 mg by nebulization Every 4 (Four) Hours As Needed for Wheezing., Disp: 90 mL, Rfl: 3  •  albuterol sulfate  (90 Base) MCG/ACT inhaler, Inhale 2 puffs Every 4 (Four) Hours As Needed for Wheezing., Disp: 18 g, Rfl: 5  •  amLODIPine (NORVASC) 10 MG tablet, Take 1 tablet by mouth Daily., Disp: 90 tablet, Rfl: 1  •  atenolol (TENORMIN) 100 MG tablet, Take 1 tablet by mouth Every Morning., Disp: 90 tablet, Rfl: 1  •  atenolol (TENORMIN) 50 MG tablet, Take 1 tablet by mouth Every Evening., Disp: 90 tablet, Rfl: 1  •  baclofen (LIORESAL) 10 MG tablet, Take 1 tablet by mouth 3 (Three) Times a Day As Needed for Muscle Spasms., Disp: 90 tablet, Rfl: 1  •  ibuprofen (ADVIL,MOTRIN) 800 MG tablet, Take 1 tablet by mouth Every 8 (Eight) Hours As Needed for Mild Pain  or Moderate Pain ., Disp: 90 tablet, Rfl: 1  •  losartan (COZAAR) 25 MG tablet, Take 1 tablet by mouth Daily., Disp: 90 tablet, Rfl: 1  •  multivitamin with minerals tablet tablet, Take 1 tablet by mouth Daily. gummy, Disp: , Rfl:   •  pantoprazole (PROTONIX) 40 MG EC tablet, Take 1 tablet by mouth twice daily, Disp: 90 tablet, Rfl: 0  •  Probiotic Product (PROBIOTIC PO), Take 1 capsule by mouth Daily., Disp: , Rfl:   •  simvastatin (ZOCOR) 40 MG tablet, Take 1 tablet by mouth Every Night., Disp: 90 tablet, Rfl: 1  •  traMADol (ULTRAM) 50 MG tablet, Take 1 tablet by mouth Every 8 (Eight) Hours As Needed for Moderate Pain ., Disp: 90 tablet, Rfl: 0  •  DULoxetine (CYMBALTA) 30 MG capsule, Take 1 capsule by mouth Daily for 7 days, THEN 2 capsules Daily for 28 days., Disp: 60 capsule, Rfl: 0  •  pregabalin (Lyrica) 50 MG capsule, Take 1 capsule by mouth 3 (Three) Times a Day., Disp: 90 capsule, Rfl: 0    Patient Active Problem List   Diagnosis   • Chronic pain disorder   • Controlled substance agreement signed   •  Degeneration of cervical disc without myelopathy   • Esophageal reflux   • Essential hypertension   • Hip pain, bilateral   • Hyperlipidemia   • Lumbar spinal stenosis   • Morbid obesity with BMI of 45.0-49.9, adult (HCC)   • Primary osteoarthritis of right knee   • Serum calcium elevated   • Tobacco use disorder   • Vitamin D deficiency   • Cervicalgia   • Cervical radiculopathy   • Numbness and tingling of both upper extremities   • Cigarette smoker     Past Surgical History:   Procedure Laterality Date   • ARM NERVE EXPLORATION Bilateral     both elbows   • BACK SURGERY      X 4    • EXPLORATORY LAPAROTOMY     • MOUTH SURGERY      x2   • REPLACEMENT TOTAL KNEE Right    • SHOULDER SURGERY Bilateral     exploratory surgery x5   • TUBAL ABDOMINAL LIGATION       Social History     Socioeconomic History   • Marital status: Single   Tobacco Use   • Smoking status: Current Every Day Smoker     Packs/day: 0.50     Types: Cigarettes   • Smokeless tobacco: Never Used   Vaping Use   • Vaping Use: Never used   Substance and Sexual Activity   • Alcohol use: Not Currently   • Drug use: Yes     Types: Marijuana     Comment: once every 2 weeks or so   • Sexual activity: Not Currently     History reviewed. No pertinent family history.  Office Visit on 12/03/2021   Component Date Value Ref Range Status   • Glucose 12/03/2021 102 (A) 65 - 99 mg/dL Final   • BUN 12/03/2021 13  8 - 23 mg/dL Final   • Creatinine 12/03/2021 0.93  0.57 - 1.00 mg/dL Final   • Sodium 12/03/2021 137  136 - 145 mmol/L Final   • Potassium 12/03/2021 4.1  3.5 - 5.2 mmol/L Final   • Chloride 12/03/2021 101  98 - 107 mmol/L Final   • CO2 12/03/2021 26.5  22.0 - 29.0 mmol/L Final   • Calcium 12/03/2021 9.7  8.6 - 10.5 mg/dL Final   • Total Protein 12/03/2021 7.5  6.0 - 8.5 g/dL Final   • Albumin 12/03/2021 4.50  3.50 - 5.20 g/dL Final   • ALT (SGPT) 12/03/2021 22  1 - 33 U/L Final   • AST (SGOT) 12/03/2021 27  1 - 32 U/L Final   • Alkaline Phosphatase 12/03/2021  94  39 - 117 U/L Final   • Total Bilirubin 12/03/2021 0.4  0.0 - 1.2 mg/dL Final   • eGFR   Amer 12/03/2021 74  >60 mL/min/1.73 Final   • Globulin 12/03/2021 3.0  gm/dL Final   • A/G Ratio 12/03/2021 1.5  g/dL Final   • BUN/Creatinine Ratio 12/03/2021 14.0  7.0 - 25.0 Final   • Anion Gap 12/03/2021 9.5  5.0 - 15.0 mmol/L Final   • TSH 12/03/2021 0.944  0.270 - 4.200 uIU/mL Final   • Total Cholesterol 12/03/2021 202 (A) 0 - 200 mg/dL Final   • Triglycerides 12/03/2021 215 (A) 0 - 150 mg/dL Final   • HDL Cholesterol 12/03/2021 55  40 - 60 mg/dL Final   • LDL Cholesterol  12/03/2021 110 (A) 0 - 100 mg/dL Final   • VLDL Cholesterol 12/03/2021 37  5 - 40 mg/dL Final   • LDL/HDL Ratio 12/03/2021 1.89   Final   • Hepatitis C Ab 12/03/2021 <0.1  0.0 - 0.9 s/co ratio Final   • Thyroid Peroxidase Antibody 12/03/2021 <8  0 - 34 IU/mL Final   • WBC 12/03/2021 8.13  3.40 - 10.80 10*3/mm3 Final   • RBC 12/03/2021 5.48 (A) 3.77 - 5.28 10*6/mm3 Final   • Hemoglobin 12/03/2021 14.4  12.0 - 15.9 g/dL Final   • Hematocrit 12/03/2021 46.5  34.0 - 46.6 % Final   • MCV 12/03/2021 84.9  79.0 - 97.0 fL Final   • MCH 12/03/2021 26.3 (A) 26.6 - 33.0 pg Final   • MCHC 12/03/2021 31.0 (A) 31.5 - 35.7 g/dL Final   • RDW 12/03/2021 14.4  12.3 - 15.4 % Final   • RDW-SD 12/03/2021 44.5  37.0 - 54.0 fl Final   • MPV 12/03/2021 12.3 (A) 6.0 - 12.0 fL Final   • Platelets 12/03/2021 250  140 - 450 10*3/mm3 Final   • Neutrophil % 12/03/2021 64.1  42.7 - 76.0 % Final   • Lymphocyte % 12/03/2021 27.7  19.6 - 45.3 % Final   • Monocyte % 12/03/2021 6.0  5.0 - 12.0 % Final   • Eosinophil % 12/03/2021 1.1  0.3 - 6.2 % Final   • Basophil % 12/03/2021 0.9  0.0 - 1.5 % Final   • Immature Grans % 12/03/2021 0.2  0.0 - 0.5 % Final   • Neutrophils, Absolute 12/03/2021 5.21  1.70 - 7.00 10*3/mm3 Final   • Lymphocytes, Absolute 12/03/2021 2.25  0.70 - 3.10 10*3/mm3 Final   • Monocytes, Absolute 12/03/2021 0.49  0.10 - 0.90 10*3/mm3 Final   • Eosinophils,  Absolute 12/03/2021 0.09  0.00 - 0.40 10*3/mm3 Final   • Basophils, Absolute 12/03/2021 0.07  0.00 - 0.20 10*3/mm3 Final   • Immature Grans, Absolute 12/03/2021 0.02  0.00 - 0.05 10*3/mm3 Final   • nRBC 12/03/2021 0.0  0.0 - 0.2 /100 WBC Final   • Color, UA 12/03/2021 Yellow  Yellow, Straw Final   • Appearance, UA 12/03/2021 Clear  Clear Final   • pH, UA 12/03/2021 6.0  5.0 - 8.0 Final   • Specific Gravity, UA 12/03/2021 1.009  1.005 - 1.030 Final   • Glucose, UA 12/03/2021 Negative  Negative Final   • Ketones, UA 12/03/2021 Negative  Negative Final   • Bilirubin, UA 12/03/2021 Negative  Negative Final   • Blood, UA 12/03/2021 Trace (A) Negative Final   • Protein, UA 12/03/2021 Negative  Negative Final   • Leuk Esterase, UA 12/03/2021 Negative  Negative Final   • Nitrite, UA 12/03/2021 Negative  Negative Final   • Urobilinogen, UA 12/03/2021 0.2 E.U./dL  0.2 - 1.0 E.U./dL Final   • RBC, UA 12/03/2021 0-2  None Seen, 0-2 /HPF Final   • WBC, UA 12/03/2021 0-2  None Seen, 0-2 /HPF Final   • Bacteria, UA 12/03/2021 2+ (A) None Seen /HPF Final   • Squamous Epithelial Cells, UA 12/03/2021 3-6 (A) None Seen, 0-2 /HPF Final   • Hyaline Casts, UA 12/03/2021 0-2  None Seen /LPF Final   • Methodology 12/03/2021 Automated Microscopy   Final   • Interpretation 12/03/2021 Comment   Final    Negative  Not infected with HCV, unless recent infection is suspected or other  evidence exists to indicate HCV infection.      US BREAST BILATERAL  BILATERAL BREAST ULTRASOUND - 12/14/2011    INDICATION:  Pain, far upper outer quadrant and lateral aspect left   breast.  The patient declines mammography.  Therefore, bilateral   ultrasound performed.    COMPARISON:  5/23/2008 left breast ultrasound and bilateral mammography   from 7/6/2009.    FINDINGS:  The patient demonstrated the area of clinical concern in the   far lateral and upper outer quadrant of the left breast for the   sonographer and me.  This entire area is examined and there is  no   sonographic abnormality. Recommend clinical follow-up for the patient's   breast pain at this time.    Next, both breasts are examined in their entirety and are negative   sonographically.    ACR Category 1, negative.    Anurag Babb M.D.  Breast Imaging, Body Imaging  [facility]  PBW/cap  D:12/14/2011/T:12/14/2011  XR Chest PA and Lateral  TWO VIEW CHEST  7/8/2012     CLINICAL INDICATION:  High blood pressure.      COMPARISON:  1/8/2007.    REPORT:  Cardiac lead wires overlie the chest.  Cardiac silhouette and   pulmonary vasculature within normal limits.  Lungs are grossly clear.    Degenerative changes in the shoulders.      Primitivo Cruz M.D.  Neuroradiology, Body Imaging  [facility]  [email or web address]   DPL/clb  D:7/8/2012 /T:7/9/2012  CT HEAD WO CONTRAST  HEAD CT, 7/8/2012     INDICATION: Headache and tingling in the left arm. Hypertension.    COMPARISON: 9/20/2009    TECHNIQUE: Noncontrast head CT.    REPORT: Ventricles and sulci are normal. There is no acute intracranial   hemorrhage, intracranial mass, or CT findings to suggest recent cerebral   ischemia. The soft tissues at the skull base and orbits are unremarkable.   Demonstrated paranasal sinuses and mastoid air cells are clear.    CONCLUSION: Unremarkable noncontrast head CT.    Primitivo Cruz M.D.  Neuroradiology, Body Imaging  [facility]  [email or web address]   DPL/mjm  D:7/8/2012/T:7/9/2012  CT CARDIAC CORONARY ARTERIES  This result has an attachment that is not available.  [facility]----CORONARY CT ANGIOGRAM    Date: 7/9/2012  Ordering Physician: Braeden Trejo M.D.  Indication: Chest pain with equivocal stress test.    CONCLUSIONS:  1.  Total Agatston calcium score is 7, which places this individual   between the 50th and 75th percentile when compared to an age and gender   matched control group and implies an intermediate risk of cardiac events   in the next ten years.  2.  Mild  stenosis involving the ostial segment of the first diagonal.  3.  Left main LAD and left circumflex appear widely patent.  The right   coronary artery also appears widely patent.  Very short segments of the   mid to distal right coronary artery are not well visualized secondary to   motion artifact.  4.  Please review radiology portion for incidental noncardiac findings.    Note: Our cardiologists would be pleased to discuss this report or its   clinical implications with you; for assistance, please call [phone].    TECHNIQUE: ECG gated CT of the heart with and without intravenous contrast   (Omnipaque 350, 104 mL) was performed on a Siemens Definition dual source   CT scanner. The imaging protocol was individualized to minimize radiation   exposure. Sequential scanning was performed for coronary calcium   evaluation. Spiral scanning was performed using ECG dose modulation for   coronary CT angiography after administering 0.4 mg sublingual   nitroglycerin spray. Average heart rate during the study was 61 beats per   minute (heart rate range 56 to 65 beats per minute). Best reconstructions   occurred at best diastole.  The patient did have motion of the RCA and   additional milisecond reconstructions were obtained.  Image post   processing was performed on a [employer] workstation. This study   was performed after a discussion of the goals, risk, benefits and   alternatives of the procedure. Risks discussed include the risk of   contrast injection and diagnostic x-ray exposure.    FINDINGS    CT WITHOUT CONTRAST    CORONARY CALCIUM SCORE: The total Agatston calcium score is 7 and is   localized as follows:  Left main: 0, left anterior descendin,   circumflex: 0, right coronary artery: 0. This amount of coronary artery   calcifications places this individual between the 50th and 75th percentile   when compared to an age and gender matched control group and implies and   intermediate risk of cardiac  events in the next 10 years.    CT WITH CONTRAST    DOMINANCE:  Right.    LEFT MAIN: The left main is a very short vessel.  No plaque or stenosis   noted.     LEFT ANTERIOR DESCENDING: The left anterior descending artery is widely   patent without any stenosis or plaque.  The first diagonal has calcific   plaque in its ostial segment that results in mild (less than 30%   stenosis).     LEFT CIRCUMFLEX: The left circumflex and its major branches are widely   patent without any detectable stenosis or plaque.    RIGHT CORONARY ARTERY: The right coronary artery is a dominant vessel that   gives rise to the PDA.  No significant plaque is noted.  There is some   motion artifact effecting the mid to distal portion of the RCA and its   branch vessels.  Multiple reconstructions were reviewed and most of the   segments are visualized without any significant stenosis.     ADDITIONAL FINDINGS: Normal variant pulmonary venous anatomy with three   right-sided and two left-sided pulmonary veins.  Normal size aortic root   and visualized portion of the ascending and descending thoracic aorta.    Pericardium appears normal.  Please review radiology portion for   incidental noncardiac findings.    Alessio Pablo MD  [facility]  MELISSAK/jaime  D: 7/9/2012 T:7/9/2012    OVERREAD     OVERREAD    OVERREAD        OVERREAD OF CT CORONARY ARTERIES  7/9/2012     FINDINGS:  The lungs are clear.  There is no pleural effusion.    Normal caliber thoracic aorta.  Mediastinum negative without mass or   adenopathy.      The chest wall is unremarkable.  Images of the upper abdomen are negative.    CONCLUSION:    No significant extracardiac findings     Vahe Arias M.D.  Body Imaging, Breast Imaging  [facility]  [email or web address]   RAHEEL/partha  D:7/9/2012 /T:7/9/2012  US Abdomen Complete  ABDOMINAL ULTRASOUND 7/10/2012, 1440h:    COMPARISON:  10/22/2009.    INDICATION:  Right upper quadrant pain.      FINDINGS:  The  liver and gallbladder are normal.  Hepatopetal flow in the   main portal vein on Doppler exam.  No biliary dilatation.  Common bile   duct is at the upper limits of normal at 5-6 mm in diameter.  Pancreas is   obscured by a bowel gas.  The spleen is normal in size.  There is a stable   2.2 cm hypoechoic mass in the spleen consistent with a hemangioma.  Both   kidneys are negative for hydronephrosis.  The right kidney measures 10.4   cm, left kidney 10.3 cm pole to pole.  Normal caliber abdominal aorta and   IVC.    CONCLUSION:    1.  No acute findings.  2.  Stable 2 cm splenic mass consistent with a hemangioma.  3.  The pancreas is obscured by bowel gas.    Andrew Han M.D.  Emergency Radiology  [facility]  [email or web address]   PPS/ldk  D:7/10/2012/T:7/10/2012  XR Chest PA and Lateral  Jul 25, 2012 09:27:00 AM  PA AND LATERAL CHEST X-RAY    INDICATION: Chest pain.  COMPARISON: 7/8/2012    FINDINGS: Heart size and pulmonary vascularity normal.  Lungs clear.   No change.    CONCLUSION: NEGATIVE CHEST.  XR Chest PA and Lateral  Aug 29, 2012 09:12:00 PM  PA AND LATERAL CHEST X-RAY    INDICATION: Chest pain.  COMPARISON: 7/25/2012    FINDINGS: Heart size and pulmonary vascularity normal.  Lungs clear. No change.    CONCLUSION: NEGATIVE CHEST.  XR CHEST 2 VIEWS PA AND LATERAL  Indication:  Chest pain.    Comparison:  Chest x-ray dated 29 August 2012.    Findings:  PA and lateral chest x-rays show a normal cardiac mediastinal silhouette.  The lungs show no focal pulmonary opacities.  Sharp pleural margins.  No pneumothorax.    Impression:  No evidence of acute pulmonary abnormalities.    Dictated by Aram Jacob @ Apr 19 2013  4:35PM    (Electronically Signed)  XR EPIDURAL INJ CERVICAL AND THORACIC (IA)  This result has an attachment that is not available.  XR EPIDURAL INJ CERVICAL AND THORACIC (IA)  This result has an attachment that is not available.  XR NERVE BLOCK INJ OTHER PERIPHERAL NERVE  (IA)  This result has an attachment that is not available.  XR UPPER GI  [facility]  XR UPPER GI  4/29/2014 9:41 AM    INDICATION: 53-year-old female with abdominal pain.  TECHNIQUE: Routine.  COMPARISON: 10/27/2009.    FINDINGS:  FLUOROSCOPIC TIME: 4.3.    ESOPHAGUS: Normal. Normal peristalsis. No strictures, masses, or inflammatory changes. No gastroesophageal reflux in recumbent position.  STOMACH: In the distal gastric antrum again seen is a rounded filling defect extending towards the duodenal bulb. This could represent Brunner's gland hyperplasia. Again the finding is unchanged and correlation with endoscopy if not performed previously recommended. Remainder of the stomach is negative.  DUODENUM: Remainder of the duodenum negative. Free flow of contrast into the proximal small bowel.    CONCLUSION:  1.  No change since 10/27/2009. There is soft tissue fullness in the distal gastric antrum and proximal duodenal bulb which appears unchanged. Again this could represent Brunner's gland hyperplasia and correlation with endoscopy if not performed previously recommended.  2.  Esophagus, stomach and duodenum are otherwise negative.  US Abdomen Complete  US ABDOMEN COMPLETE  4/30/2014 8:54 AM    INDICATION: Abdominal pain, nausea.  COMPARISON: 07/10/2012.    FINDINGS:  GALLBLADDER: Normal, without cholelithiasis.  BILE DUCTS: No bile duct dilation. The common hepatic duct measures 5 mm.  LIVER: Mild diffuse hepatic steatosis with increased echogenicity throughout both lobes. Normal liver configuration. No focal hepatic mass.  SPLEEN: Again seen is the heterogeneous approximately 2cm lesion in the anterior spleen most compatible with a hemangioma. Normal size spleen.  RIGHT KIDNEY: 10.0 x 4.0 x 4.0 cm in length. Normal. No hydronephrosis.  LEFT KIDNEY: 9.0 x 4.0 x 4.0 cm in length. Normal. No hydronephrosis.  PANCREAS: Obscured by overlying bowel gas, head and body portions grossly negative.  AORTA: Normal in  caliber.  IVC: Normal, patent where seen.    No ascites.    CONCLUSION:  1.  No cholelithiasis and no bile duct dilatation.  2.  Diffuse hepatic steatosis again seen.  3.  Stable 2cm splenic mass most compatible with a hemangioma.  4.  Pancreas obscured by overlying bowel gas.  XR HIP 2 VIEWS LEFT  Indication:  Left hip pain.    Technique:  Two views of the left hip.    Comparison:  None.    Findings:  Mild osteoarthritis.  Patchy density the femoral head, raising concern of avascular necrosis.  No obvious fracture.    Impression:  1. Question AVN.  Further evaluation with MRI suggested.  2. Mild osteoarthritis.    Dictated by Carlos Jacobsen @ Oct  2 2014  1:53PM    (Electronically Signed)  US Pelvis Transvaginal Non OB  [facility]  US PELVIS COMPLETE TA AND TV  10/30/2014 9:22 AM    INDICATION: Incidentally seen pelvic mass at 10/17/2014 MRI [facility] indeterminate for adnexal versus uterine origin.  TECHNIQUE: Transabdominal scans were performed. Endovaginal ultrasound was performed to better visualize the adnexa.  COMPARISON: 10/17/2014 coronal T1 weighted imaging of the pelvis [facility]. Patient was unable to complete the MRI examination, therefore there is only 1 series to review. 11/02/2010 ultrasound hysterosonogram [facility].    FINDINGS:  Uterus measures 10 x 7 x 5 cm. 3.3 cm intramural fibroid right uterine body. 3.2 cm subserosal fibroid posterior uterine body. 4.8 x 3.5 x 3.2 cm intramural fibroid left uterine body. Several smaller fibroids in the anterior uterine body. 1.5 cm fibroid anterior uterine body distorts the anterior endometrium slightly but is probably intramural. The fibroids account for the findings at recent MRI. Compared with 11/02/2010 the fibroids have increased in size.    Endometrial thickness is 6 mm. Normal smooth endometrium.    Right ovary measures 2.4 x 1.5 x 1.4 cm. Normal right ovary. Normal arterial duplex.    Left  ovary not visualized.    No significant free fluid in the cul-de-sac.    CONCLUSION:  1.  Uterine fibroids account for findings at pelvic MRI 10/17/2014. The uterine fibroids have increased in size since 11/02/2010.  2.  Endometrial thickness 6 mm. Correlate with menopausal and hormone replacement status.  3.  Uterus and right ovary otherwise negative.  4.  Nonvisualization left ovary.  CT Head Brain wo  [facility] IMAG[facility]  HEAD CT WITHOUT IV CONTRAST  3/6/2015 2:58 AM    INDICATION: Headache, hypertension  TECHNIQUE: Head CT without IV contrast.  COMPARISON: 07/08/2012    FINDINGS: No hemorrhage, mass, or mass effect. Normal orbits. Clear paranasal sinuses and mastoid air cells.    CONCLUSION:  Normal.  XR MAMMO BILAT SCREEN FFDM  Narrative: This result has an attachment that is not available.  XR MAMMO BILAT SCREEN FFDM [.0]    CLINICAL HISTORY:  This is an asymptomatic 54 y.o. patient.    INDICATION FOR EXAM: Mammogram Screening.    TECHNIQUE: CC & MLO views were obtained.  This digital study was evaluated   with the assistance of Computer-Aided Detection.     COMPARISON FILM: Yes  7/6/09 5/23/08     FINDINGS:  Mammographically, the breast tissue has scattered   fibroglandular densities.  There are no dominant masses, suspicious micro   calcifications or areas of architectural distortion.  Impression:   There is no radiographic evidence for malignancy.  Recommend   annual mammograms.    A lay language report of this examination will be provided to the patient.     MAMMOGRAM ASSESSMENT:  ACR 1 Negative  US THYROID/PARATHYROID  This result has an attachment that is not available.    [facility]  US THYROID/PARATHYROID  10/16/2015 3:15 PM    INDICATION: Elevated parathyroid hormone level.  TECHNIQUE: Routine.  COMPARISON: None.    FINDINGS: Exam quality is slightly compromised given patient body habitus.  Right thyroid lobe measures 5.9 x 2.7 x 3.0 cm. There are numerous nodules present  throughout the right lobe of the thyroid. These all measure at least 1 cm in size. The dominant nodule measures 1.5 x 0.7 x 0.9 cm in size within the superolateral aspect of the right lobe which is heterogeneous and hypoechoic in appearance.    Left thyroid lobe measures 5.3 x 3.0 x 2.7 cm. Multiple tiny nodules are present within the left lobe of the thyroid. The largest measures 1 cm in greatest dimension most inferiorly with the other subcentimeter in size.    Isthmus measures 8 mm. No additional findings.    No lymphadenopathy. No evidence of parathyroid adenoma.    CONCLUSION:  1.  No sonographic evidence of parathyroid adenoma.  2.  Numerous bilateral thyroid nodules, compatible with multinodular goiter.    This report was electronically interpreted by: Dr. Deny Aquino MD ON 10/16/2015 at 16:37  XR CHEST 2 VIEWS PA AND LATERAL  XR CHEST 2 VIEWS PA AND LATERAL  11/17/2015 10:20 AM    INDICATION: Abnormal Laboratory Test  COMPARISON: 08/29/2012    FINDINGS: Heart size and mediastinum are normal. Normal vasculature. Lungs and pleural spaces are clear without signs of active disease.    This report was electronically interpreted by: Dr. Josias Corona MD ON 11/17/2015 at 14:22  MR HEAD BRAIN IACS WWO  This result has an attachment that is not available.  CT Abdomen Pelvis With Contrast  This result has an attachment that is not available.    [facility]  CT ABDOMEN PELVIS W  3/1/2016 9:33 AM        INDICATION: Diffuse generalized abdominal pain for 3 months.  TECHNIQUE: CT abdomen and pelvis. Multiplanar reformation images (MPR). Dose reduction techniques were used.  IV CONTRAST: 100 mL Omnipaque [id number].  COMPARISON: CT abdomen pelvis 05/13/2015.    FINDINGS:  LUNG BASES: Negative.    ABDOMEN: Hypervascular lesion inferior spleen should represent a hemangioma measuring approximately 1.6 cm. Spleen otherwise negative. Liver, gallbladder, pancreas, adrenal glands negative. Very mild  generalized atrophy of the kidneys with lobulated contour. Kidneys otherwise negative. No retroperitoneal adenopathy. Normal caliber aorta. Appendix normal. Small bowel unremarkable.    PELVIS: No pelvic adenopathy. Moderate sigmoid diverticulosis. Slight stranding in the pericolonic fat along the left pelvis could be due to very mild diverticulitis. No abscess. Stable approximately 3.7 cm uterine fibroid along the left posterior aspect. Smaller 1.6 cm subserosal uterine fibroid along the fundus. No abnormal adnexal mass. No free fluid.    MUSCULOSKELETAL: Pedicle screw and edgard fixation lower lumbar spine and sacrum. Degenerative change in both hips. No suspicious osseous lesions.    CONCLUSION:  1.  Moderate sigmoid diverticulosis and probable very mild sigmoid diverticulitis, no abscess.  2.  Uterine fibroids unchanged.  3.  Benign-appearing hypervascular lesion in the spleen presumably a hemangioma.  4.  Mild generalized renal cortical atrophy bilaterally.  5.  Postoperative change lumbosacral spine.    NOTE: ABNORMAL REPORT    THE DICTATION ABOVE DESCRIBES AN ABNORMALITY FOR WHICH FOLLOW-UP IS NEEDED.    This report was electronically interpreted by: Dr. Tunde Betancourt MD ON 03/01/2016 at 10:36  XR KNEE 2 VIEWS LEFT  XR KNEE 2 VIEWS LEFT  8/11/2016 11:17 AM    INDICATION:  Chronic Pain Of Both Knees Chronic Pain Of Both Knees Chronic Pain Of Both Knees  COMPARISON: None.    FINDINGS: No acute fracture or dislocation. Marginal osteophyte formation, moderate of the medial and patellofemoral compartments and mild in the lateral compartment. Small ossific density along the posterior aspect of the knee concerning for an intra-articular loose body. Joint spaces not well assessed on this nonweightbearing study.  XR KNEE 2 VIEWS RIGHT  XR KNEE 2 VIEWS RIGHT  8/11/2016 11:17 AM    INDICATION:  Chronic Pain Of Both Knees  COMPARISON: None.    FINDINGS: No acute fracture or dislocation. Moderate marginal tricompartmental  osteophyte formation. Ossific densities along the posterior aspect of the knee joint concerning for intra-articular loose bodies. Joint spaces not well assessed due to nonweightbearing technique.  XR SPINE LUMBAR 3 VIEWS  XR SPINE LUMBAR 3 VIEWS  8/19/2016 2:22 AM    INDICATION: Back Pain  COMPARISON: None.    FINDINGS: Posterior instrumented fusion from L3 through S1 with bilateral L3, L5 and S1 pedicle screws with vertical stabilization rods. The hardware is intact. Vertebral body heights and alignment is normal. No radiographic evidence for acute fracture or subluxation. Abnormal density to the left femoral head worrisome for avascular necrosis.  XR HIP 2 OR 3 VIEWS W PELVIS RIGHT  XR HIP 2 OR 3 VIEWS W PELVIS RIGHT  8/19/2016 2:26 AM    INDICATION: Back Pain  COMPARISON: None.    FINDINGS: Multilevel lumbosacral pedicle screw and posterior edgard fusion hardware. Degenerative change both hips with joint space narrowing. No acute fracture or dislocation. Left hip demonstrates a sclerotic lucent appearance suspicious for avascular necrosis.  MRI Cyberknife Lumbar Spine Without Contrast  [facility]  MR SPINE LUMBAR WO  8/23/2016 3:12 PM     INDICATION: Right hip pain. Right leg and foot pain.  TECHNIQUE: Performed without IV contrast.  SEDATION: None.  COMPARISON: None.    FINDINGS: Nomenclature is based on 5 lumbar type vertebral bodies. Rudimentary disc at S1-S2. Postsurgical changes again seen associated with posterior instrumented fusion of the L3 through S1 vertebral bodies with horizontal cross-link at the L4 level. Dorsal vertical rods connect paired pedicle screws within the L3, L5, and S1 vertebral bodies. There is solid fusion across the L4-L5 and L5-S1 disc space. Vertebral body heights are maintained. Minimal anterolisthesis of L4 on L5. No MR evidence of recent fracture. Posterior laminectomies at L3-L4 through L5-S1. There may be a small amount of fluid along the dorsal aspect of the remaining  L4 spinous process as seen on sagittal T2 and STIR imaging. No pars defect. The conus tip is identified at L1. Grossly normal paraspinal soft tissues. Normal aortic diameter. Moderate degenerative changes of the right hip and moderate to advanced degenerative changes of the left hip.    T12-L1: Normal disc height and signal. No herniation. Mild facet arthropathy. No spinal canal stenosis. No right neural foraminal stenosis. No left neural foraminal stenosis.    L1-L2: Normal disc height and signal. No herniation. Moderate facet arthropathy. No spinal canal stenosis. No right neural foraminal stenosis. No left neural foraminal stenosis.    L2-L3: Normal disc signal and disc space height. Minimal posterior annular bulge. No protrusion or extrusion. Ligamentum flavum hypertrophy. Moderate to advanced facet arthropathy. Severe spinal canal stenosis. No right neural foraminal stenosis. No left neural foraminal stenosis.    L3-L4: Normal disc signal and disc space height. Minimal posterior annular bulge. Decompressive laminectomy. No protrusion or extrusion. Susceptibility artifact degrades evaluation of the facet joints. No central spinal canal stenosis. No right neural foraminal stenosis. No left neural foraminal stenosis.    L4-L5: Partial fusion across the left aspect of the disc space. Minimal posterior disc interbody spurring. Decompressive laminectomy. No central spinal canal stenosis. No right neural foraminal stenosis. No left neural foraminal stenosis.    L5-S1: Solid fusion across the disc space. Mild osteophytic spurring seen posteriorly and involving the right and left neural foramina. Decompressive laminectomy. No central spinal canal stenosis. Moderate right and mild to moderate left neural foraminal stenosis.     CONCLUSION:  1.  Postsurgical changes of posterior instrumented fusion of the L3 through S1 vertebral bodies with decompressive laminectomies at L3-L4 through L5-S1.  2.  Anterior interbody fusion  of the L4 through S1 vertebral bodies.  3.  Minimal posterior annular bulge at L3-L4 in conjunction with ligamentum flavum hypertrophy and degenerative facet arthropathy results in severe central spinal canal stenosis.  4.  Moderate right and mild to moderate left neural foraminal narrowing at L5-S1.  MRI Hip Right Without Contrast  MRI RIGHT HIP/PELVIS  8/23/2016 3:43 PM    INDICATION: Severe right hip-ischial pain. No recent injury.  TECHNIQUE: Routine.  COMPARISON: None.    FINDINGS: Image quality is suboptimal secondary to patient's large body size, motion, and inhomogeneous fat suppression.    RIGHT HIP: There is likely degenerative tearing of the right acetabular labrum anterosuperiorly and superiorly. Assessment of the articular cartilage is not considered diagnostic, there is likely grade II chondromalacia superomedially. Mild marginal spurring. Subchondral cystic change right acetabulum posteriorly. Small juxta-articular or para-labral cyst posteriorly. No significant effusion. Femoral head-neck alpha angle in the oblique axial plane is approximately 40 degrees. No evidence for fracture or AVN.    LEFT HIP: Mild degenerative change left hip joint with small hip joint effusion. Subchondral marrow edema or cystic change left femoral head medially and in the left acetabulum posteriorly.    TENDONS: Mild distal right gluteus medius tendinopathy and peritendinitis. No tendon tearing. Mild proximal right hamstring tendinopathy. Iliopsoas tendons intact.    BONES AND SOFT TISSUES: No significant marrow signal abnormality. Mild degenerative change of the SI joints, with a small amount of fluid in both joints.. Postoperative and degenerative changes in the spine.    Sigmoid colonic diverticula.    CONCLUSION:  1.  No acute appearing abnormality to explain patient's symptoms. No acute fracture.  2.  Mild degenerative change of the right hip joint. There is likely degenerative tearing of the right acetabular  labrum.  3.  Mild degenerative change of the left hip joint.  XR CHEST 2 VIEWS PA AND LATERAL  XR CHEST 2 VIEWS PA AND LATERAL  2/26/2017 10:48 AM    INDICATION: Cough  COMPARISON: 11/17/2015    FINDINGS: Negative chest. Lungs clear. No change.  XR FOOT 3 VIEWS LEFT  This result has an attachment that is not available.  RADIOGRAPHS:  Three views of the left foot obtained today are negative for fracture.   Increase in the medial longitudinal arch noted. Small heel spur noted.    The joint spaces are well maintained. There are no significant   degenerative changes noted. Bone mineralization is normal.     ===============================================================  Genaro Lui DPM  Department of Foot and Ankle Surgery/Podiatry  Board Certified in Foot and Ankle Surgery, American Board of  Foot and   Ankle Surgery  Fellow of the American College of Foot and Ankle Surgeons   XR DXA BONE DENSITY 2 SITES AXIAL  This result has an attachment that is not available.  XR Knee 3 View Bilateral  Narrative: This result has an attachment that is not available.  This radiology exam was performed at the [place] location in the [facility] Department and interpreted by Fernando Anthony MD.    HISTORY    Right knee pain.    TECHNICAL    Views were obtained consisting of bilateral AP weight bearing, bilateral   novoa weight bearing, bilateral sunrise non weight bearing and right   lateral views of the knee(s).      FINDINGS:  Subchondral sclerosis  End Stage (bone on bone) patellofemoral compartment joint space narrowing   bilateral knee(s)  Moderate medial compartment joint space narrowing bilateral knee(s)  Osteophytes patellofemoral, medial, lateral bilateral knee(s)  No evidence of fracture or dislocation  Impression: Osteoarthritis of bilateral knee.    All services were personally performed by Fernando Anthony MD.   Documentation performed by Moshe Guerrero ATC based on my  observation of   services performed and provider statements to me.    Fernando Anthony MD  1/18/2018  XR MAMMO BILAT SCREENING  Narrative: This result has an attachment that is not available.  XR MAMMO BILAT SCREENING [[id number]]    CLINICAL HISTORY:  This is an asymptomatic 57 y.o. patient.    INDICATION FOR EXAM: Mammogram Screening.    TECHNIQUE: CC & MLO views were obtained.  This digital study was evaluated   with the assistance of Computer-Aided Detection.     COMPARISON FILM: Yes  7/24/15   5/23/08     FINDINGS:  Mammographically, the breast tissue has scattered   fibroglandular densities.  There are no dominant masses, suspicious micro   calcifications or areas of architectural distortion.  Impression:   There is no radiographic evidence for malignancy.  Recommend   annual mammograms.    A lay language report of this examination will be provided to the patient.     MAMMOGRAM ASSESSMENT:  ACR 1 Negative  US Pelvis Transvaginal Non OB  This result has an attachment that is not available.  [facility]  US PELVIS COMPLETE TA AND TV  2/23/2018 1:28 PM    INDICATION: Post-menopausal bleeding.  TECHNIQUE: Transabdominal scans were performed. Endovaginal ultrasound was  performed to better visualize the adnexa.  COMPARISON: 03/01/2016 CT and 10/30/2014 pelvic ultrasound.    FINDINGS:  Uterus measures 10 x 5 x 4 cm. At least 6 discrete uterine fibroids identified.  These have not changed significantly. 1.2 cm (previously 1.5 cm) fibroid  anterior uterine body distorts the anterior endometrium slightly but appears  intramural not submucosal. 3.3 cm intramural fibroid right uterine body. 3.2 cm  subserosal fibroid posterior uterine body. 4.8 x 3.5 x 3.2 cm intramural fibroid  left uterine body. Several smaller fibroids in the anterior uterine body.    Endometrial thickness is 3 mm. Normal smooth endometrium.    Right ovary is not visualized and may be obscured by overlying bowel gas. Of  note, the  right ovary does appear to be present at most recent CT.     Left ovary is surgically absent.    CONCLUSION:  1.  Multiple uterine fibroids have not changed significantly. Uterus is only  minimally enlarged.  2.  Endometrium within normal limits.  3.  Right ovary not visualized and likely obscured by bowel gas.  4.  Left ovary is surgically absent.  XR Hand 2 View Right  [facility]   XR HAND 2 VIEWS RIGHT  9/11/2018 11:22 AM    INDICATION: Right hand pain. Pain first finger.  COMPARISON: None.    FINDINGS: Mild osteoarthritis first CMC joint along the base of the thumb and  the DIP joint right index finger. Right hand otherwise negative. No fracture.  XR FOOT 3 VIEWS LEFT  [facility]   XR FOOT 3 VIEWS LEFT  9/11/2018 11:23 AM    INDICATION: Left foot pain.  COMPARISON: 03/14/2017    FINDINGS: Negative foot. No fracture or dislocation.  XR Knee 2 Views Right Portable - PACU  [facility]  XR KNEE 2 VIEWS RIGHT PORTABLE  10/29/2018 1:08 PM    INDICATION: Eval Prothesis/appliance  COMPARISON: 01/18/2018    FINDINGS: Right total knee arthroplasty. No adjacent fracture or lucency. Gas in  the joint and subcutaneous soft tissues.  XR Knee 1 or 2 View Bilateral  Narrative: This result has an attachment that is not available.  This radiology exam was performed at the [facility] in the [facility] Department and interpreted by Fernando Anthony MD.    HISTORY    Status post a right total knee arthroplasty dated 10/29/2018    TECHNICAL    Weightbearing views were obtained consisting of AP, lateral, sunrise views   of the right knee(s).      FINDINGS:  Good alignment of prosthesis with no evidence of loosening. No evidence of   fracture or intraarticular loose bodies.  Impression: Stable status post a right total knee arthroplasty dated 10/29/2018    All services were personally performed by Fernando Anthony MD.   Documentation  performed by Norma Butler ATC based on my observation   of services performed and provider statements to me.    Fernando Anthony MD  12/11/2018  XR Knee 1 or 2 View Bilateral  Narrative: This result has an attachment that is not available.  This radiology exam was performed at the [facility] in the [facility] Department and interpreted by Fernando Anthony MD.    HISTORY    Status post a right total knee arthroplasty dated 10/29/2019    TECHNICAL    Weightbearing views were obtained consisting of AP, lateral, sunrise views   of the right knee(s).      FINDINGS:  Good alignment of prosthesis with no evidence of loosening. No evidence of   fracture or intraarticular loose bodies.  Impression: Stable status post a right total knee arthroplasty dated 10/29/2019    All services were personally performed by Fernando Anthony MD.   Documentation performed by Norma Butler ATC based on my observation   of services performed and provider statements to me.    Fernando Anthony MD  8/28/2019  XR PELVIS 1 VIEW AP AND HIP 2 VIEW BILATERAL  EXAM: XR PELVIS 1 VIEW AP AND HIP 2 VIEW BILATERAL  LOCATION: [facility]   DATE/TIME: 11/5/2019 12:17 PM    INDICATION: Left Groin Pain  COMPARISON: None.    IMPRESSION: Advanced changes of osteoarthritis left hip. Mild degenerative  change right hip.  XR MAMMO BILAT SCREENING  Narrative: This result has an attachment that is not available.  XR MAMMO BILAT SCREENING [[id number]]    CLINICAL HISTORY:  This is an asymptomatic 59 y.o. patient.    INDICATION FOR EXAM: Mammogram Screening.    TECHNIQUE: CC & MLO views were obtained.  This digital study was evaluated   with the assistance of Computer-Aided Detection.     COMPARISON FILM: Yes  2/1/18 [facility]  7/24/15 [facility]    FINDINGS:  Mammographically, the breast tissue has scattered   fibroglandular densities.  There are no dominant masses,  suspicious micro   calcifications or areas of architectural distortion.  Impression:   There is no radiographic evidence for malignancy.  Recommend   annual mammograms.    A lay language report of this examination will be provided to the patient.     MAMMOGRAM ASSESSMENT:  ACR 1 Negative  XR Spine Cervical 3 View  EXAM: XR SPINE CERVICAL 3 VIEWS  LOCATION: [facility]   DATE/TIME: 3/13/2020 10:16 AM    INDICATION: Neck pain.  COMPARISON: None.    IMPRESSION: Satisfactory vertebral body height. Interspace narrowing and  osteophytes. 1 mm posterior subluxation C4 upon C5. Normal cervical prevertebral  soft tissues. Airway appears patent. No fracture or high-grade subluxation is  present.  MRI Cyberknife Cervical Spine Without Contrast  This result has an attachment that is not available.  EXAM: MR SPINE CERVICAL WO  LOCATION: [facility]  DATE/TIME: 3/17/2020 9:38 AM    INDICATION: Neck pain, left upper extremity radicular pain  COMPARISON: Cervical spine radiographs  03/13/2020  TECHNIQUE: Without IV contrast.    FINDINGS:   Normal vertebral body heights. There is mild broad reversal of the normal  cervical lordosis centered at C4, with slight anterolisthesis of C3 on C4,  slight retrolisthesis of C5 on C6 and of C6 on C7, as well as slight  anterolisthesis of C7 on T1. Mild Modic 1 degenerative endplate changes at  C7-T1. No pathologic bone marrow signal abnormality. Normal appearance of the  craniocervical junction and C1-C2. Normal appearance of the cervical spinal  cord, without pathologic signal abnormality or expansion. The visualized  intracranial contents are unremarkable. Grossly normal paraspinal soft tissues.  The vertebral artery flow voids are preserved. There are posterior disc bulges  at the T1-T2 through T5-T6 levels in the visualized upper thoracic spine,  producing mild spinal canal stenosis at these levels.    C2-C3: Normal disc height. No herniation. Mild right facet  arthropathy. Moderate  right and mild left uncovertebral joint hypertrophy. No spinal canal stenosis.  Mild right neural foraminal stenosis. No left neural foraminal stenosis.    C3-C4: Normal disc height. Small posterior disc osteophyte complex. Moderate  right facet arthropathy. Mild bilateral uncovertebral joint hypertrophy. Mild  spinal canal stenosis. Moderate right neural foraminal stenosis. Mild left  neural foraminal stenosis.    C4-C5: Mild loss of disc height. Broad-based circumferential disc osteophyte  complex, eccentric to the right posteriorly. Mild bilateral facet arthropathy.  Severe right and moderate left uncovertebral joint hypertrophy. Mild spinal  canal stenosis. Severe right neural foraminal stenosis. Severe left neural  foraminal stenosis.      C5-C6: Mild loss of disc height. Broad-based circumferential disc osteophyte  complex, mildly eccentric to the left posteriorly. Mild left facet arthropathy.  Moderate bilateral uncovertebral joint hypertrophy. No spinal canal stenosis.  Mild to moderate right neural foraminal stenosis. Moderate to severe left neural  foraminal stenosis.    C6-C7: Moderate loss of disc height. Small circumferential disc osteophyte  complex. Mild bilateral facet arthropathy. No uncovertebral joint hypertrophy.  No spinal canal stenosis. No right neural foraminal stenosis. No left neural  foraminal stenosis.    C7-T1: Mild loss of disc height. No herniation. No facet arthropathy. No  uncovertebral joint hypertrophy. No spinal canal stenosis. No right neural  foraminal stenosis. No left neural foraminal stenosis.    CONCLUSION:  1.  Multilevel degenerative changes of the cervical spine as described in detail  above, greatest at the C4-C5 level, where there is mild spinal canal stenosis  and severe bilateral neuroforaminal stenosis.  2.  At C3-C4, there is mild spinal canal stenosis with moderate right and mild  left neuroforaminal stenosis.  3.  At C5-C6, there is mild to  moderate right and moderate to severe left  neuroforaminal stenosis.  4.  Multilevel posterior disc bulges in the visualized thoracic spine producing  mild spinal canal stenosis at the T1-T2 through T5-T6 level.  CT HEAD BRAIN WO  EXAM: CT HEAD BRAIN WO  LOCATION: [facility] IMAG[facility]  DATE/TIME: 6/13/2020 12:51 AM    INDICATION: fall, loss of consciousness  COMPARISON: 7/15/17 CT  TECHNIQUE: Routine without IV contrast. Multiplanar reformats. Dose reduction  techniques were used.    FINDINGS:  INTRACRANIAL CONTENTS: No intracranial hemorrhage, extraaxial collection, or  mass effect.  No CT evidence of acute infarct. Normal parenchymal attenuation.  Normal ventricles and sulci.     VISUALIZED ORBITS/SINUSES/MASTOIDS: No intraorbital abnormality. No paranasal  sinus mucosal disease. No middle ear or mastoid effusion.    BONES/SOFT TISSUES: No acute abnormality. Empty sella. This is typically an  incidental finding.    IMPRESSION:  1.  No acute intracranial abnormality or significant change compared to  07/15/2017.  XR Knee 4+ View Left  EXAM: XR KNEE 4 VIEWS LEFT  LOCATION: [facility]  DATE/TIME: 6/13/2020 1:02 AM    INDICATION: Syncope  LEG PAIN/PROBLEM  COMPARISON: None.    IMPRESSION: No joint effusion. No acute fracture dislocation. Mild to moderate  osteoarthritis, most prominently of the medial compartment.  XR HIP 2 OR 3 VIEWS W PELVIS LEFT  EXAM: XR HIP 2 OR 3 VIEWS W PELVIS LEFT  LOCATION: [facility]  DATE/TIME: 6/13/2020 1:02 AM    INDICATION: Syncope, leg pain problem  COMPARISON: 11/05/2019    IMPRESSION: No fracture. Severe osteoarthritis of the left hip, moderate in the  right. Prior lower lumbar fusion.  XR INJ HIP JOINT LEFT  EXAM:  1. LEFT HIP JOINT INJECTION   2. FLUOROSCOPIC GUIDANCE  LOCATION: [facility]  DATE/TIME: 8/5/2020 10:55 AM     INDICATION: Primary Osteoarthritis Of Left Hip    PROCEDURE: Procedure and risks explained and consent received. A time out  was  performed where the proper patient, procedure, and site were confirmed. The skin  was prepped and draped in sterile fashion. 5 mL 1% lidocaine infused in local  soft tissues.     Under direct fluoroscopic guidance, a 22 gauge spinal needle was introduced into  the joint space. Position was confirmed with injection of nonionic contrast  material.        INJECTION:  2 mL of 40 mg per mL of Depo-Medrol.    4 mL of 0 preservative free 1% lidocaine.    COMPLICATIONS: None.    SPECIMEN: None.    RADIOLOGIC SUPERVISION AND INTERPRETATION: Fluoroscopy demonstrates  intraarticular needle tip position.    FLUOROSCOPIC TIME: 36 seconds  NUMBER OF IMAGES: 1    IMPRESSION:  1.  Fluoroscopic-guided left hip joint injection.      [email or web address]  Immunization History   Administered Date(s) Administered   • Tdap 01/11/2018     The following portions of the patient's history were reviewed and updated as appropriate: allergies, current medications, past family history, past medical history, past social history, past surgical history and problem list.    PHQ-9 Total Score:             Physical Exam  Constitutional:       Appearance: Normal appearance.   HENT:      Head: Normocephalic and atraumatic.      Right Ear: External ear normal.      Left Ear: External ear normal.   Eyes:      General:         Right eye: No discharge.         Left eye: No discharge.      Conjunctiva/sclera: Conjunctivae normal.   Cardiovascular:      Rate and Rhythm: Normal rate and regular rhythm.      Pulses: Normal pulses.      Heart sounds: Normal heart sounds. No murmur heard.  Pulmonary:      Effort: Pulmonary effort is normal. No respiratory distress.      Breath sounds: Normal breath sounds.   Abdominal:      General: There is no distension.      Palpations: Abdomen is soft.      Tenderness: There is no abdominal tenderness.   Musculoskeletal:      Cervical back: Normal range of motion.      Right lower leg: No edema.      Left lower leg: No edema.       Comments: Patient's pain out of proportion with exam.  Pain with limited movement of left hip.   Lymphadenopathy:      Cervical: No cervical adenopathy.   Neurological:      Mental Status: She is alert. Mental status is at baseline.   Psychiatric:         Mood and Affect: Mood normal.         Assessment/Plan    Diagnosis Plan   1. Left hip pain  XR Hips Bilateral With or Without Pelvis 2 View    Ambulatory Referral to Orthopedic Surgery   2. Essential hypertension  amLODIPine (NORVASC) 10 MG tablet    atenolol (TENORMIN) 100 MG tablet    atenolol (TENORMIN) 50 MG tablet    losartan (COZAAR) 25 MG tablet   3. Hyperlipidemia, unspecified hyperlipidemia type  simvastatin (ZOCOR) 40 MG tablet   4. Gastroesophageal reflux disease without esophagitis     5. Chronic pain disorder  ibuprofen (ADVIL,MOTRIN) 800 MG tablet    baclofen (LIORESAL) 10 MG tablet    DULoxetine (CYMBALTA) 30 MG capsule    pregabalin (Lyrica) 50 MG capsule   6. Degeneration of cervical disc without myelopathy  ibuprofen (ADVIL,MOTRIN) 800 MG tablet    baclofen (LIORESAL) 10 MG tablet   7. Spinal stenosis of lumbar region without neurogenic claudication  ibuprofen (ADVIL,MOTRIN) 800 MG tablet    baclofen (LIORESAL) 10 MG tablet      Orders Placed This Encounter   Procedures   • XR Hips Bilateral With or Without Pelvis 2 View     Standing Status:   Future     Number of Occurrences:   1     Standing Expiration Date:   4/28/2023     Order Specific Question:   Reason for Exam:     Answer:   chronic hip pain   • Ambulatory Referral to Orthopedic Surgery     Referral Priority:   Routine     Referral Type:   Consultation     Referral Reason:   Specialty Services Required     Requested Specialty:   Orthopedic Surgery     Number of Visits Requested:   1     Chronic pain; concern for chronic pain syndrome/fibromyalgia.  Work-up thus far has been reassuring, advised on importance of neurosurgery follow-up, MRI.  After discussion, will start on Cymbalta,  Lyrica as above.  Needs medication refills as above, blood pressure well controlled, reassuring.  Additionally, has received hip injections in the past, will refer.  Follow-up in 1 month, sooner if needed.  Marcos reviewed and appropriate, no signs of abuse, misuse.        This document has been electronically signed by Santiago Quan MD on April 28, 2022 18:06 CDT    EMR Dragon/Transciption Disclaimer: Some of this note may be an electronic transcription/translation of spoken language to printed text.  The electronic translation of spoken language may permit erroneous, or at times, nonsensical words or phrases to be inadvertently transcribed. Although I have reviewed the note for such errors, some may still exist.

## 2022-05-03 ENCOUNTER — TELEPHONE (OUTPATIENT)
Dept: FAMILY MEDICINE CLINIC | Facility: CLINIC | Age: 62
End: 2022-05-03

## 2022-05-03 NOTE — TELEPHONE ENCOUNTER
Received a message that Jovana needed PA. Sent in through [email or web address]      Approved for 12 fills from 5/3/22 - 5/2/23    Faxed letter to pharmacy.

## 2022-05-06 ENCOUNTER — TELEPHONE (OUTPATIENT)
Dept: FAMILY MEDICINE CLINIC | Facility: CLINIC | Age: 62
End: 2022-05-06

## 2022-05-06 DIAGNOSIS — M54.12 CERVICAL RADICULOPATHY: Primary | ICD-10-CM

## 2022-05-06 DIAGNOSIS — M54.2 CERVICALGIA: ICD-10-CM

## 2022-05-06 DIAGNOSIS — M50.30 DEGENERATION OF CERVICAL DISC WITHOUT MYELOPATHY: ICD-10-CM

## 2022-05-06 NOTE — TELEPHONE ENCOUNTER
Pt called back about imaging results. Informed her of what Dr Quan said. She voiced understanding.    Patient asked about referral to pain management. I told her that Dr Quan referred her in December and gave her the number for the pain clinic. She said she would call. Told her to call back if there are any issues with that referral.     She also stated that she was supposed to get an MRI of her cervical spine but was not able to get a ride to go to [place] to have it done. She is wondering if the MRI can be done in [place]. I told her I would reach out to the ordering provider and ask.

## 2022-05-09 NOTE — TELEPHONE ENCOUNTER
Called patient and left message that she must have her MRI done at [facility], we will need to put in new orders since she was a no show for her last scheduled. MRI.

## 2022-05-09 NOTE — TELEPHONE ENCOUNTER
Pt called our office and I gave her the information from [facility]. She voiced understanding. She is ok to do it in [place] if she needs to.

## 2022-05-23 ENCOUNTER — TELEPHONE (OUTPATIENT)
Dept: NEUROSURGERY | Facility: CLINIC | Age: 62
End: 2022-05-23

## 2022-05-23 DIAGNOSIS — M54.12 CERVICAL RADICULOPATHY: ICD-10-CM

## 2022-05-23 DIAGNOSIS — M54.2 CERVICALGIA: Primary | ICD-10-CM

## 2022-05-23 NOTE — TELEPHONE ENCOUNTER
Called patient to let her know that her insurance has denied her MRI of the cervical until she has completed physical therapy.    She has decided she would like to go to [facility] in [place], we will fax orders to them for her therapy    I have also made her an appointment to see JOI Cotter following the therapy, this is scheduled for July.    Also rescheduled the June appointment with Dr. Mejía to follow all of this, this has been scheduled for September with Dr. Mejía.    Reminders of all this has been mailed to patient per her request.

## 2022-05-25 ENCOUNTER — TELEPHONE (OUTPATIENT)
Dept: NEUROSURGERY | Facility: CLINIC | Age: 62
End: 2022-05-25

## 2022-05-25 NOTE — TELEPHONE ENCOUNTER
Patient called the office stating she had imaging of her neck done in [place] and wanted to know how to get the imaging to us, she said she had some images on her mychart and wanted to send them to us. I advised her that she needs to contact the facility she had her imaging done at and have them mail us a cd with that imaging. I gave her our address and she voiced understanding.

## 2022-05-26 ENCOUNTER — APPOINTMENT (OUTPATIENT)
Dept: MRI IMAGING | Facility: HOSPITAL | Age: 62
End: 2022-05-26

## 2022-06-01 ENCOUNTER — OFFICE VISIT (OUTPATIENT)
Dept: ORTHOPEDIC SURGERY | Facility: CLINIC | Age: 62
End: 2022-06-01

## 2022-06-01 VITALS — BODY MASS INDEX: 47.92 KG/M2 | WEIGHT: 287.6 LBS | HEIGHT: 65 IN

## 2022-06-01 DIAGNOSIS — M25.552 HIP PAIN, BILATERAL: Primary | ICD-10-CM

## 2022-06-01 DIAGNOSIS — M16.0 PRIMARY OSTEOARTHRITIS OF BOTH HIPS: ICD-10-CM

## 2022-06-01 DIAGNOSIS — M25.551 HIP PAIN, BILATERAL: Primary | ICD-10-CM

## 2022-06-01 PROCEDURE — 99213 OFFICE O/P EST LOW 20 MIN: CPT | Performed by: NURSE PRACTITIONER

## 2022-06-01 NOTE — PROGRESS NOTES
Norah Urbina is a 61 y.o. female   Primary provider:  Santiago Quan MD       Chief Complaint   Patient presents with   • Left Hip - Pain   • Right Hip - Pain       HISTORY OF PRESENT ILLNESS:    Ms. Urbina is a 61-year-old female who presents today with complaints of bilateral hip pain.  Right hip pain has been present for the past few years while left hip pain is probably been present for around 20.  She reports pain is severe and constant.  Pain is a stabbing and aching pain.  Pain is associated with occasional popping.  Standing, sitting, driving, walking aggravates pain.  She has tried Tylenol and image guided injections into hip joint without relief of symptoms.  Her recent kidney function was decreased with a GFR of 33.  She has no complaints of fever, nausea, vomiting.      Pain  This is a chronic problem. The current episode started more than 1 year ago. The problem occurs constantly. The problem has been gradually worsening. Associated symptoms include arthralgias, chest pain, joint swelling and myalgias. The symptoms are aggravated by walking and standing (Sitting/Driving). She has tried NSAIDs and acetaminophen (Injections) for the symptoms. The treatment provided mild relief.        CONCURRENT MEDICAL HISTORY:    Past Medical History:   Diagnosis Date   • Cervical disc disorder    • Fibromyalgia, primary    • Hyperlipidemia    • Hypertension    • Low back pain        Allergies   Allergen Reactions   • Citrus Swelling     Lips swell with oranges, librado and limes   • Penicillins Rash     Gave patient a yeast infection    • Percocet [Oxycodone-Acetaminophen] Hives         Current Outpatient Medications:   •  albuterol (PROVENTIL) (2.5 MG/3ML) 0.083% nebulizer solution, Take 2.5 mg by nebulization Every 4 (Four) Hours As Needed for Wheezing., Disp: 90 mL, Rfl: 3  •  albuterol sulfate  (90 Base) MCG/ACT inhaler, Inhale 2 puffs Every 4 (Four) Hours As Needed for Wheezing., Disp: 18 g, Rfl: 5  •   amLODIPine (NORVASC) 10 MG tablet, Take 1 tablet by mouth Daily., Disp: 90 tablet, Rfl: 1  •  atenolol (TENORMIN) 100 MG tablet, Take 1 tablet by mouth Every Morning., Disp: 90 tablet, Rfl: 1  •  atenolol (TENORMIN) 50 MG tablet, Take 1 tablet by mouth Every Evening., Disp: 90 tablet, Rfl: 1  •  baclofen (LIORESAL) 10 MG tablet, Take 1 tablet by mouth 3 (Three) Times a Day As Needed for Muscle Spasms., Disp: 90 tablet, Rfl: 1  •  ibuprofen (ADVIL,MOTRIN) 800 MG tablet, Take 1 tablet by mouth Every 8 (Eight) Hours As Needed for Mild Pain  or Moderate Pain ., Disp: 90 tablet, Rfl: 1  •  losartan (COZAAR) 25 MG tablet, Take 1 tablet by mouth Daily., Disp: 90 tablet, Rfl: 1  •  multivitamin with minerals tablet tablet, Take 1 tablet by mouth Daily. gummy, Disp: , Rfl:   •  pantoprazole (PROTONIX) 40 MG EC tablet, Take 1 tablet by mouth twice daily, Disp: 90 tablet, Rfl: 0  •  Probiotic Product (PROBIOTIC PO), Take 1 capsule by mouth Daily., Disp: , Rfl:   •  simvastatin (ZOCOR) 40 MG tablet, Take 1 tablet by mouth Every Night., Disp: 90 tablet, Rfl: 1  •  traMADol (ULTRAM) 50 MG tablet, Take 1 tablet by mouth Every 8 (Eight) Hours As Needed for Moderate Pain ., Disp: 90 tablet, Rfl: 0    Past Surgical History:   Procedure Laterality Date   • ARM NERVE EXPLORATION Bilateral     both elbows   • BACK SURGERY      X 4    • EXPLORATORY LAPAROTOMY     • MOUTH SURGERY      x2   • REPLACEMENT TOTAL KNEE Right    • SHOULDER SURGERY Bilateral     exploratory surgery x5   • TUBAL ABDOMINAL LIGATION         History reviewed. No pertinent family history.    Social History     Socioeconomic History   • Marital status: Single   Tobacco Use   • Smoking status: Current Every Day Smoker     Packs/day: 0.50     Types: Cigarettes   • Smokeless tobacco: Never Used   Vaping Use   • Vaping Use: Never used   Substance and Sexual Activity   • Alcohol use: Not Currently   • Drug use: Yes     Types: Marijuana     Comment: once every 2 weeks or  "so   • Sexual activity: Not Currently        Review of Systems   Constitutional: Negative.    HENT: Positive for postnasal drip.    Eyes: Positive for visual disturbance.   Respiratory: Positive for wheezing.    Cardiovascular: Positive for chest pain.   Gastrointestinal: Negative.    Endocrine: Negative.    Genitourinary: Negative.    Musculoskeletal: Positive for arthralgias, joint swelling and myalgias.   Skin: Negative.    Allergic/Immunologic: Negative.    Neurological: Positive for dizziness.   Hematological: Bruises/bleeds easily.   Psychiatric/Behavioral: Positive for sleep disturbance.       PHYSICAL EXAMINATION:       Ht 165.1 cm (65\")   Wt 130 kg (287 lb 9.6 oz)   BMI 47.86 kg/m²     Physical Exam  Vitals and nursing note reviewed.   Constitutional:       General: She is not in acute distress.     Appearance: She is well-developed. She is not toxic-appearing.   HENT:      Head: Normocephalic.   Pulmonary:      Effort: Pulmonary effort is normal. No respiratory distress.   Skin:     General: Skin is warm and dry.   Neurological:      Mental Status: She is alert and oriented to person, place, and time.   Psychiatric:         Behavior: Behavior normal.         Thought Content: Thought content normal.         Judgment: Judgment normal.         GAIT:     []  Normal  [x]  Antalgic    Assistive device: []  None  [x]  Walker     []  Crutches  []  Cane     []  Wheelchair  []  Stretcher    Right Hip Exam     Tenderness   The patient is experiencing tenderness in the anterior.    Tests   LUANN: negative  Fadir:  Positive FADIR test    Comments:  Mild to moderate pain and limitations with arc of motion.  Positive Stinchfield      Left Hip Exam     Tenderness   The patient is experiencing tenderness in the anterior.    Tests   LUANN: negative  Fadir:  Positive FADIR test    Comments:  Mild to moderate pain and limitations with arc of motion.  Positive Stincfield                  No results " found.        ASSESSMENT:    Diagnoses and all orders for this visit:    Hip pain, bilateral    Primary osteoarthritis of both hips          PLAN      X-rays reviewed, narrowing in bilateral hips observed.  Patient has reportedly tried and failed treatments for osteoarthritis of hip including Tylenol, NSAIDs, activity modification, and guided injections.  She is interested in surgical interventions.  Discussed with patient that her current BMI is 47.8, recommend patient focus on weight loss and then return to see surgeon when BMI has decreased below 45, ideally below 40 to discuss elective surgical management.  Patient verbalized understanding.  Dietitian referral offered, patient declines.  Recommend patient avoid NSAIDs and use Tylenol as needed for pain.  We also discussed other conservative measures including repeat intra-articular injection, physical therapy, pain management referral etc., patient is only interested in surgical intervention at this time.    Return in about 3 months (around 9/1/2022).    EMR Dragon/Transciption Disclaimer: Some of this note may be an electronic transcription/translation of spoken language to printed text.  The electronic translation of spoken language may permit erroneous, or at times, nonsensical words or phrases to be inadvertently transcribed. Although I have reviewed the note for such errors, some may still exist.     Time spent of a minimum of 30 minutes including the face to face evaluation, reviewing of medical history and prior medial records, reviewing of diagnostic studies, ordering additional tests, documentation, patient education and coordination of care.       This document has been electronically signed by JOI King on June 1, 2022 11:58 CDT

## 2022-06-02 ENCOUNTER — TELEPHONE (OUTPATIENT)
Dept: FAMILY MEDICINE CLINIC | Facility: CLINIC | Age: 62
End: 2022-06-02

## 2022-06-02 ENCOUNTER — OFFICE VISIT (OUTPATIENT)
Dept: FAMILY MEDICINE CLINIC | Facility: CLINIC | Age: 62
End: 2022-06-02

## 2022-06-02 VITALS
TEMPERATURE: 96.8 F | OXYGEN SATURATION: 97 % | HEIGHT: 65 IN | SYSTOLIC BLOOD PRESSURE: 156 MMHG | WEIGHT: 289 LBS | DIASTOLIC BLOOD PRESSURE: 100 MMHG | HEART RATE: 70 BPM | BODY MASS INDEX: 48.15 KG/M2

## 2022-06-02 DIAGNOSIS — I10 ESSENTIAL HYPERTENSION: ICD-10-CM

## 2022-06-02 DIAGNOSIS — T23.222A SECOND DEGREE BURN OF FINGER OF LEFT HAND, INITIAL ENCOUNTER: ICD-10-CM

## 2022-06-02 DIAGNOSIS — M25.551 HIP PAIN, BILATERAL: Primary | ICD-10-CM

## 2022-06-02 DIAGNOSIS — M25.552 HIP PAIN, BILATERAL: Primary | ICD-10-CM

## 2022-06-02 PROCEDURE — 99214 OFFICE O/P EST MOD 30 MIN: CPT | Performed by: STUDENT IN AN ORGANIZED HEALTH CARE EDUCATION/TRAINING PROGRAM

## 2022-06-02 RX ORDER — HYDROCODONE BITARTRATE AND ACETAMINOPHEN 5; 325 MG/1; MG/1
1 TABLET ORAL EVERY 8 HOURS PRN
Qty: 60 TABLET | Refills: 0 | Status: SHIPPED | OUTPATIENT
Start: 2022-06-02 | End: 2022-07-07

## 2022-06-02 NOTE — PROGRESS NOTES
Subjective:  Norah Urbina is a 61 y.o. female who presents for     Hypertension; currently on atenolol 100 mg a.m., 50 mg p.m., losartan 25 mg daily, amlodipine 10 mg daily.  Patient states that blood pressure has been well controlled at home but believes it is elevated today due to hip pain, neck pain.  Denies chest pain, shortness of breath, headaches, vision changes, numbness, ting, weakness, leg swelling, orthopnea.    Bilateral hip pain; history of physical therapy, conservative management, injections.  Patient states would like surgery however due to BMI is not a candidate.  Was previously on tramadol 50 mg 3 times daily for neck pain, patient states that has to take benadryl with medication due to itchiness.  Additionally, has had Percocet in the past which caused her to have hives.  States she has been able to tolerate Norco or hydrocodone before without any issues.    Burn; states grabbed some hot sugar and suffered burn on left middle finger. States that has had mild pain, large blister over finger since.  Has not used anything, denies any erythema, darkening of skin, discharge, numbness, tingling.    Patient Active Problem List   Diagnosis   • Chronic pain disorder   • Controlled substance agreement signed   • Degeneration of cervical disc without myelopathy   • Esophageal reflux   • Essential hypertension   • Hip pain, bilateral   • Hyperlipidemia   • Lumbar spinal stenosis   • Morbid obesity with BMI of 45.0-49.9, adult (HCC)   • Primary osteoarthritis of right knee   • Serum calcium elevated   • Tobacco use disorder   • Vitamin D deficiency   • Cervicalgia   • Cervical radiculopathy   • Numbness and tingling of both upper extremities   • Cigarette smoker   • Left hip pain     Vitals:    Vitals:    06/02/22 1626 06/02/22 1635   BP: 138/98 156/100   BP Location: Right arm Left arm   Patient Position: Sitting Sitting   Cuff Size: Large Adult Large Adult   Pulse: 70    Temp: 96.8 °F (36 °C)    SpO2: 97%   "  Weight: 131 kg (289 lb)    Height: 165.1 cm (65\")      Body mass index is 48.09 kg/m².      Current Outpatient Medications:   •  albuterol (PROVENTIL) (2.5 MG/3ML) 0.083% nebulizer solution, Take 2.5 mg by nebulization Every 4 (Four) Hours As Needed for Wheezing., Disp: 90 mL, Rfl: 3  •  albuterol sulfate  (90 Base) MCG/ACT inhaler, Inhale 2 puffs Every 4 (Four) Hours As Needed for Wheezing., Disp: 18 g, Rfl: 5  •  amLODIPine (NORVASC) 10 MG tablet, Take 1 tablet by mouth Daily., Disp: 90 tablet, Rfl: 1  •  atenolol (TENORMIN) 100 MG tablet, Take 1 tablet by mouth Every Morning., Disp: 90 tablet, Rfl: 1  •  atenolol (TENORMIN) 50 MG tablet, Take 1 tablet by mouth Every Evening., Disp: 90 tablet, Rfl: 1  •  baclofen (LIORESAL) 10 MG tablet, Take 1 tablet by mouth 3 (Three) Times a Day As Needed for Muscle Spasms., Disp: 90 tablet, Rfl: 1  •  losartan (COZAAR) 25 MG tablet, Take 1 tablet by mouth Daily., Disp: 90 tablet, Rfl: 1  •  pantoprazole (PROTONIX) 40 MG EC tablet, Take 1 tablet by mouth twice daily, Disp: 90 tablet, Rfl: 0  •  Probiotic Product (PROBIOTIC PO), Take 1 capsule by mouth Daily., Disp: , Rfl:   •  simvastatin (ZOCOR) 40 MG tablet, Take 1 tablet by mouth Every Night., Disp: 90 tablet, Rfl: 1  •  HYDROcodone-acetaminophen (Norco) 5-325 MG per tablet, Take 1 tablet by mouth Every 8 (Eight) Hours As Needed for Moderate Pain  or Severe Pain ., Disp: 60 tablet, Rfl: 0    Patient Active Problem List   Diagnosis   • Chronic pain disorder   • Controlled substance agreement signed   • Degeneration of cervical disc without myelopathy   • Esophageal reflux   • Essential hypertension   • Hip pain, bilateral   • Hyperlipidemia   • Lumbar spinal stenosis   • Morbid obesity with BMI of 45.0-49.9, adult (HCC)   • Primary osteoarthritis of right knee   • Serum calcium elevated   • Tobacco use disorder   • Vitamin D deficiency   • Cervicalgia   • Cervical radiculopathy   • Numbness and tingling of both upper " extremities   • Cigarette smoker   • Left hip pain     Past Surgical History:   Procedure Laterality Date   • ARM NERVE EXPLORATION Bilateral     both elbows   • BACK SURGERY      X 4    • EXPLORATORY LAPAROTOMY     • MOUTH SURGERY      x2   • REPLACEMENT TOTAL KNEE Right    • SHOULDER SURGERY Bilateral     exploratory surgery x5   • TUBAL ABDOMINAL LIGATION       Social History     Socioeconomic History   • Marital status: Single   Tobacco Use   • Smoking status: Current Every Day Smoker     Packs/day: 0.50     Types: Cigarettes   • Smokeless tobacco: Never Used   Vaping Use   • Vaping Use: Never used   Substance and Sexual Activity   • Alcohol use: Not Currently   • Drug use: Not Currently     Types: Marijuana     Comment: once every 2 weeks or so   • Sexual activity: Not Currently     History reviewed. No pertinent family history.  Office Visit on 12/03/2021   Component Date Value Ref Range Status   • Glucose 12/03/2021 102 (A) 65 - 99 mg/dL Final   • BUN 12/03/2021 13  8 - 23 mg/dL Final   • Creatinine 12/03/2021 0.93  0.57 - 1.00 mg/dL Final   • Sodium 12/03/2021 137  136 - 145 mmol/L Final   • Potassium 12/03/2021 4.1  3.5 - 5.2 mmol/L Final   • Chloride 12/03/2021 101  98 - 107 mmol/L Final   • CO2 12/03/2021 26.5  22.0 - 29.0 mmol/L Final   • Calcium 12/03/2021 9.7  8.6 - 10.5 mg/dL Final   • Total Protein 12/03/2021 7.5  6.0 - 8.5 g/dL Final   • Albumin 12/03/2021 4.50  3.50 - 5.20 g/dL Final   • ALT (SGPT) 12/03/2021 22  1 - 33 U/L Final   • AST (SGOT) 12/03/2021 27  1 - 32 U/L Final   • Alkaline Phosphatase 12/03/2021 94  39 - 117 U/L Final   • Total Bilirubin 12/03/2021 0.4  0.0 - 1.2 mg/dL Final   • eGFR   Amer 12/03/2021 74  >60 mL/min/1.73 Final   • Globulin 12/03/2021 3.0  gm/dL Final   • A/G Ratio 12/03/2021 1.5  g/dL Final   • BUN/Creatinine Ratio 12/03/2021 14.0  7.0 - 25.0 Final   • Anion Gap 12/03/2021 9.5  5.0 - 15.0 mmol/L Final   • TSH 12/03/2021 0.944  0.270 - 4.200 uIU/mL Final   •  Total Cholesterol 12/03/2021 202 (A) 0 - 200 mg/dL Final   • Triglycerides 12/03/2021 215 (A) 0 - 150 mg/dL Final   • HDL Cholesterol 12/03/2021 55  40 - 60 mg/dL Final   • LDL Cholesterol  12/03/2021 110 (A) 0 - 100 mg/dL Final   • VLDL Cholesterol 12/03/2021 37  5 - 40 mg/dL Final   • LDL/HDL Ratio 12/03/2021 1.89   Final   • Hepatitis C Ab 12/03/2021 <0.1  0.0 - 0.9 s/co ratio Final   • Thyroid Peroxidase Antibody 12/03/2021 <8  0 - 34 IU/mL Final   • WBC 12/03/2021 8.13  3.40 - 10.80 10*3/mm3 Final   • RBC 12/03/2021 5.48 (A) 3.77 - 5.28 10*6/mm3 Final   • Hemoglobin 12/03/2021 14.4  12.0 - 15.9 g/dL Final   • Hematocrit 12/03/2021 46.5  34.0 - 46.6 % Final   • MCV 12/03/2021 84.9  79.0 - 97.0 fL Final   • MCH 12/03/2021 26.3 (A) 26.6 - 33.0 pg Final   • MCHC 12/03/2021 31.0 (A) 31.5 - 35.7 g/dL Final   • RDW 12/03/2021 14.4  12.3 - 15.4 % Final   • RDW-SD 12/03/2021 44.5  37.0 - 54.0 fl Final   • MPV 12/03/2021 12.3 (A) 6.0 - 12.0 fL Final   • Platelets 12/03/2021 250  140 - 450 10*3/mm3 Final   • Neutrophil % 12/03/2021 64.1  42.7 - 76.0 % Final   • Lymphocyte % 12/03/2021 27.7  19.6 - 45.3 % Final   • Monocyte % 12/03/2021 6.0  5.0 - 12.0 % Final   • Eosinophil % 12/03/2021 1.1  0.3 - 6.2 % Final   • Basophil % 12/03/2021 0.9  0.0 - 1.5 % Final   • Immature Grans % 12/03/2021 0.2  0.0 - 0.5 % Final   • Neutrophils, Absolute 12/03/2021 5.21  1.70 - 7.00 10*3/mm3 Final   • Lymphocytes, Absolute 12/03/2021 2.25  0.70 - 3.10 10*3/mm3 Final   • Monocytes, Absolute 12/03/2021 0.49  0.10 - 0.90 10*3/mm3 Final   • Eosinophils, Absolute 12/03/2021 0.09  0.00 - 0.40 10*3/mm3 Final   • Basophils, Absolute 12/03/2021 0.07  0.00 - 0.20 10*3/mm3 Final   • Immature Grans, Absolute 12/03/2021 0.02  0.00 - 0.05 10*3/mm3 Final   • nRBC 12/03/2021 0.0  0.0 - 0.2 /100 WBC Final   • Color, UA 12/03/2021 Yellow  Yellow, Straw Final   • Appearance, UA 12/03/2021 Clear  Clear Final   • pH, UA 12/03/2021 6.0  5.0 - 8.0 Final   •  Specific Gravity, UA 12/03/2021 1.009  1.005 - 1.030 Final   • Glucose, UA 12/03/2021 Negative  Negative Final   • Ketones, UA 12/03/2021 Negative  Negative Final   • Bilirubin, UA 12/03/2021 Negative  Negative Final   • Blood, UA 12/03/2021 Trace (A) Negative Final   • Protein, UA 12/03/2021 Negative  Negative Final   • Leuk Esterase, UA 12/03/2021 Negative  Negative Final   • Nitrite, UA 12/03/2021 Negative  Negative Final   • Urobilinogen, UA 12/03/2021 0.2 E.U./dL  0.2 - 1.0 E.U./dL Final   • RBC, UA 12/03/2021 0-2  None Seen, 0-2 /HPF Final   • WBC, UA 12/03/2021 0-2  None Seen, 0-2 /HPF Final   • Bacteria, UA 12/03/2021 2+ (A) None Seen /HPF Final   • Squamous Epithelial Cells, UA 12/03/2021 3-6 (A) None Seen, 0-2 /HPF Final   • Hyaline Casts, UA 12/03/2021 0-2  None Seen /LPF Final   • Methodology 12/03/2021 Automated Microscopy   Final   • Interpretation 12/03/2021 Comment   Final    Negative  Not infected with HCV, unless recent infection is suspected or other  evidence exists to indicate HCV infection.      XR Hips Bilateral With or Without Pelvis 2 View  Narrative: Two view bilateral hips with single view pelvis    HISTORY: Chronic hip pain. Left hip pain.    AP and frog-leg lateral views of each hip and AP film of the  pelvis obtained.    COMPARISON: None    FINDINGS:   No fracture or dislocation.  Some narrowing of each hip joint centrally.  Subchondral cysts left femoral head.  Pedicle screws and rods lumbar spine and upper sacrum.  Minimal degenerative change each iliac crest.  No other osseous or articular abnormality.  Impression: CONCLUSION:  Some narrowing of each hip joint centrally.  Subchondral cysts left femoral head.  Pedicle screws and rods lumbar spine and upper sacrum.  Minimal degenerative change each iliac crest.    [id number]    Electronically signed by:  Genaro Greer MD  4/30/2022 5:26 PM CDT  Workstation: [id number]      @[email or web address]@  Immunization History   Administered Date(s) Administered    • Tdap 01/11/2018     The following portions of the patient's history were reviewed and updated as appropriate: allergies, current medications, past family history, past medical history, past social history, past surgical history and problem list.    PHQ-9 Total Score:           Physical Exam  Constitutional:       Appearance: Normal appearance.   HENT:      Head: Normocephalic and atraumatic.      Right Ear: External ear normal.      Left Ear: External ear normal.   Eyes:      General:         Right eye: No discharge.         Left eye: No discharge.      Conjunctiva/sclera: Conjunctivae normal.   Cardiovascular:      Rate and Rhythm: Normal rate and regular rhythm.      Pulses: Normal pulses.      Heart sounds: Normal heart sounds. No murmur heard.  Pulmonary:      Effort: Pulmonary effort is normal. No respiratory distress.      Breath sounds: Normal breath sounds.   Abdominal:      General: There is no distension.      Palpations: Abdomen is soft.      Tenderness: There is no abdominal tenderness.   Musculoskeletal:      Cervical back: Normal range of motion.      Right lower leg: No edema.      Left lower leg: No edema.   Lymphadenopathy:      Cervical: No cervical adenopathy.   Skin:     Comments: Large blister over distal phalanx of left middle finger.  No surrounding erythema, skin breakdown, discharge.  Good capillary refill.   Neurological:      Mental Status: She is alert. Mental status is at baseline.   Psychiatric:         Mood and Affect: Mood normal.         Behavior: Behavior normal.       Assessment & Plan    Diagnosis Plan   1. Hip pain, bilateral  HYDROcodone-acetaminophen (Norco) 5-325 MG per tablet   2. Second degree burn of finger of left hand, initial encounter     3. Essential hypertension        No orders of the defined types were placed in this encounter.    Bilateral hip pain; has failed conservative management, tramadol, NSAIDs, injections.  Currently awaiting surgical treatment however  due to weight, not a good candidate.  After discussion, will trial on hydrocodone as above, follow-up 1 month or sooner if needed.  Marcos reviewed and appropriate, no signs abuse, misuse.    Second-degree burn; counseled on conservative management, wound care, patient voiced understanding, agreeable to plan.    Hypertension; elevated today patient states normal blood pressure at home, likely due to pain today.  Follow-up in 1 month with blood pressure log or sooner if needed.          This document has been electronically signed by Santiago Quan MD on June 2, 2022 18:29 CDT    EMR Dragon/Transciption Disclaimer: Some of this note may be an electronic transcription/translation of spoken language to printed text.  The electronic translation of spoken language may permit erroneous, or at times, nonsensical words or phrases to be inadvertently transcribed. Although I have reviewed the note for such errors, some may still exist.

## 2022-06-03 NOTE — TELEPHONE ENCOUNTER
Patient called stating her Norco medication hadn't been sent to the pharmacy. Let patient know we did send it and the nurse sent over a PA. Patient said she just called the pharmacy and they didn't. Called and spoke to pharmacy. Clarified with patient that they had received the medication request and the PA, but they wouldn't have the medication ready until after lunch time. Patient v/u.

## 2022-06-15 DIAGNOSIS — E78.5 HYPERLIPIDEMIA, UNSPECIFIED HYPERLIPIDEMIA TYPE: ICD-10-CM

## 2022-06-15 RX ORDER — SIMVASTATIN 40 MG
TABLET ORAL
Qty: 90 TABLET | Refills: 0 | OUTPATIENT
Start: 2022-06-15

## 2022-06-15 NOTE — TELEPHONE ENCOUNTER
Rx Refill Note  Requested Prescriptions     Pending Prescriptions Disp Refills   • simvastatin (ZOCOR) 40 MG tablet [Pharmacy Med Name: Simvastatin 40 MG Oral Tablet] 90 tablet 0     Sig: TAKE 1 TABLET BY MOUTH ONCE DAILY AT NIGHT      Last office visit with prescribing clinician: 6/2/2022      Next office visit with prescribing clinician: 7/7/2022            Spencer Peña Rep  06/15/22, 08:44 CDT     This was sent in on 4/28/22 for a 3 month supply with 1 refill. Request denied

## 2022-07-07 ENCOUNTER — OFFICE VISIT (OUTPATIENT)
Dept: FAMILY MEDICINE CLINIC | Facility: CLINIC | Age: 62
End: 2022-07-07

## 2022-07-07 ENCOUNTER — LAB (OUTPATIENT)
Dept: LAB | Facility: HOSPITAL | Age: 62
End: 2022-07-07

## 2022-07-07 VITALS
HEIGHT: 65 IN | DIASTOLIC BLOOD PRESSURE: 90 MMHG | OXYGEN SATURATION: 96 % | TEMPERATURE: 96.8 F | BODY MASS INDEX: 48.13 KG/M2 | WEIGHT: 288.9 LBS | HEART RATE: 74 BPM | SYSTOLIC BLOOD PRESSURE: 132 MMHG

## 2022-07-07 DIAGNOSIS — Z79.899 HIGH RISK MEDICATION USE: Primary | ICD-10-CM

## 2022-07-07 DIAGNOSIS — M25.551 HIP PAIN, BILATERAL: ICD-10-CM

## 2022-07-07 DIAGNOSIS — M25.552 HIP PAIN, BILATERAL: ICD-10-CM

## 2022-07-07 DIAGNOSIS — E04.9 THYROID ENLARGEMENT: ICD-10-CM

## 2022-07-07 PROCEDURE — 80306 DRUG TEST PRSMV INSTRMNT: CPT | Performed by: STUDENT IN AN ORGANIZED HEALTH CARE EDUCATION/TRAINING PROGRAM

## 2022-07-07 PROCEDURE — 99214 OFFICE O/P EST MOD 30 MIN: CPT | Performed by: STUDENT IN AN ORGANIZED HEALTH CARE EDUCATION/TRAINING PROGRAM

## 2022-07-07 PROCEDURE — 80050 GENERAL HEALTH PANEL: CPT | Performed by: STUDENT IN AN ORGANIZED HEALTH CARE EDUCATION/TRAINING PROGRAM

## 2022-07-07 RX ORDER — PANTOPRAZOLE SODIUM 20 MG/1
20 TABLET, DELAYED RELEASE ORAL DAILY
COMMUNITY
End: 2022-07-07 | Stop reason: DRUGHIGH

## 2022-07-07 RX ORDER — HYDROCODONE BITARTRATE AND ACETAMINOPHEN 7.5; 325 MG/1; MG/1
1 TABLET ORAL 2 TIMES DAILY PRN
Qty: 60 TABLET | Refills: 0 | Status: SHIPPED | OUTPATIENT
Start: 2022-07-07 | End: 2022-07-29 | Stop reason: SDUPTHER

## 2022-07-07 NOTE — PROGRESS NOTES
"Subjective:  Norah Urbina is a 61 y.o. female who presents for     Bilateral hip pain; failed conservative treatment, working on weight loss to become a candidate for hip replacement.  Currently on Norco 5 mg 3 times daily as needed.  States medication not providing good relief, states believes she needs higher dose of medication and has been taking Norco 10mg daily which improves mobility and quality of life, no adverse effects, confusion, sedation, falls.     Patient Active Problem List   Diagnosis   • Chronic pain disorder   • Controlled substance agreement signed   • Degeneration of cervical disc without myelopathy   • Esophageal reflux   • Essential hypertension   • Hip pain, bilateral   • Hyperlipidemia   • Lumbar spinal stenosis   • Morbid obesity with BMI of 45.0-49.9, adult ([facility])   • Primary osteoarthritis of right knee   • Serum calcium elevated   • Tobacco use disorder   • Vitamin D deficiency   • Cervicalgia   • Cervical radiculopathy   • Numbness and tingling of both upper extremities   • Cigarette smoker   • Left hip pain     Vitals:    Vitals:    07/07/22 1116   BP: 132/90   BP Location: Left arm   Patient Position: Sitting   Cuff Size: Large Adult   Pulse: 74   Temp: 96.8 °F (36 °C)   SpO2: 96%   Weight: 131 kg (288 lb 14.4 oz)   Height: 165.1 cm (65\")     Body mass index is 48.08 kg/m².      Current Outpatient Medications:   •  albuterol (PROVENTIL) (2.5 MG/3ML) 0.083% nebulizer solution, Take 2.5 mg by nebulization Every 4 (Four) Hours As Needed for Wheezing., Disp: 90 mL, Rfl: 3  •  albuterol sulfate  (90 Base) MCG/ACT inhaler, Inhale 2 puffs Every 4 (Four) Hours As Needed for Wheezing., Disp: 18 g, Rfl: 5  •  amLODIPine (NORVASC) 10 MG tablet, Take 1 tablet by mouth Daily., Disp: 90 tablet, Rfl: 1  •  atenolol (TENORMIN) 100 MG tablet, Take 1 tablet by mouth Every Morning., Disp: 90 tablet, Rfl: 1  •  atenolol (TENORMIN) 50 MG tablet, Take 1 tablet by mouth Every Evening., Disp: 90 " tablet, Rfl: 1  •  baclofen (LIORESAL) 10 MG tablet, Take 1 tablet by mouth 3 (Three) Times a Day As Needed for Muscle Spasms., Disp: 90 tablet, Rfl: 1  •  losartan (COZAAR) 25 MG tablet, Take 1 tablet by mouth Daily., Disp: 90 tablet, Rfl: 1  •  pantoprazole (PROTONIX) 40 MG EC tablet, Take 1 tablet by mouth twice daily, Disp: 90 tablet, Rfl: 0  •  Probiotic Product (PROBIOTIC PO), Take 1 capsule by mouth Daily., Disp: , Rfl:   •  simvastatin (ZOCOR) 40 MG tablet, Take 1 tablet by mouth Every Night., Disp: 90 tablet, Rfl: 1  •  HYDROcodone-acetaminophen (Norco) 7.5-325 MG per tablet, Take 1 tablet by mouth 2 (Two) Times a Day As Needed for Moderate Pain ., Disp: 60 tablet, Rfl: 0    Patient Active Problem List   Diagnosis   • Chronic pain disorder   • Controlled substance agreement signed   • Degeneration of cervical disc without myelopathy   • Esophageal reflux   • Essential hypertension   • Hip pain, bilateral   • Hyperlipidemia   • Lumbar spinal stenosis   • Morbid obesity with BMI of 45.0-49.9, adult ([facility])   • Primary osteoarthritis of right knee   • Serum calcium elevated   • Tobacco use disorder   • Vitamin D deficiency   • Cervicalgia   • Cervical radiculopathy   • Numbness and tingling of both upper extremities   • Cigarette smoker   • Left hip pain     Past Surgical History:   Procedure Laterality Date   • ARM NERVE EXPLORATION Bilateral     both elbows   • BACK SURGERY      X 4    • EXPLORATORY LAPAROTOMY     • MOUTH SURGERY      x2   • REPLACEMENT TOTAL KNEE Right    • SHOULDER SURGERY Bilateral     exploratory surgery x5   • TUBAL ABDOMINAL LIGATION       Social History     Socioeconomic History   • Marital status: Single   Tobacco Use   • Smoking status: Current Some Day Smoker     Packs/day: 0.50     Types: Cigarettes   • Smokeless tobacco: Never Used   • Tobacco comment: between none and 10 daily   Vaping Use   • Vaping Use: Never used   Substance and Sexual Activity   • Alcohol use: Not Currently    • Drug use: Not Currently     Types: Marijuana     Comment: once every 2 weeks or so not currently   • Sexual activity: Not Currently     History reviewed. No pertinent family history.  No visits with results within 6 Month(s) from this visit.   Latest known visit with results is:   Office Visit on 12/03/2021   Component Date Value Ref Range Status   • Glucose 12/03/2021 102 (A) 65 - 99 mg/dL Final   • BUN 12/03/2021 13  8 - 23 mg/dL Final   • Creatinine 12/03/2021 0.93  0.57 - 1.00 mg/dL Final   • Sodium 12/03/2021 137  136 - 145 mmol/L Final   • Potassium 12/03/2021 4.1  3.5 - 5.2 mmol/L Final   • Chloride 12/03/2021 101  98 - 107 mmol/L Final   • CO2 12/03/2021 26.5  22.0 - 29.0 mmol/L Final   • Calcium 12/03/2021 9.7  8.6 - 10.5 mg/dL Final   • Total Protein 12/03/2021 7.5  6.0 - 8.5 g/dL Final   • Albumin 12/03/2021 4.50  3.50 - 5.20 g/dL Final   • ALT (SGPT) 12/03/2021 22  1 - 33 U/L Final   • AST (SGOT) 12/03/2021 27  1 - 32 U/L Final   • Alkaline Phosphatase 12/03/2021 94  39 - 117 U/L Final   • Total Bilirubin 12/03/2021 0.4  0.0 - 1.2 mg/dL Final   • eGFR   Amer 12/03/2021 74  >60 mL/min/1.73 Final   • Globulin 12/03/2021 3.0  gm/dL Final   • A/G Ratio 12/03/2021 1.5  g/dL Final   • BUN/Creatinine Ratio 12/03/2021 14.0  7.0 - 25.0 Final   • Anion Gap 12/03/2021 9.5  5.0 - 15.0 mmol/L Final   • TSH 12/03/2021 0.944  0.270 - 4.200 uIU/mL Final   • Total Cholesterol 12/03/2021 202 (A) 0 - 200 mg/dL Final   • Triglycerides 12/03/2021 215 (A) 0 - 150 mg/dL Final   • HDL Cholesterol 12/03/2021 55  40 - 60 mg/dL Final   • LDL Cholesterol  12/03/2021 110 (A) 0 - 100 mg/dL Final   • VLDL Cholesterol 12/03/2021 37  5 - 40 mg/dL Final   • LDL/HDL Ratio 12/03/2021 1.89   Final   • Hepatitis C Ab 12/03/2021 <0.1  0.0 - 0.9 s/co ratio Final   • Thyroid Peroxidase Antibody 12/03/2021 <8  0 - 34 IU/mL Final   • WBC 12/03/2021 8.13  3.40 - 10.80 10*3/mm3 Final   • RBC 12/03/2021 5.48 (A) 3.77 - 5.28 10*6/mm3 Final    • Hemoglobin 12/03/2021 14.4  12.0 - 15.9 g/dL Final   • Hematocrit 12/03/2021 46.5  34.0 - 46.6 % Final   • MCV 12/03/2021 84.9  79.0 - 97.0 fL Final   • MCH 12/03/2021 26.3 (A) 26.6 - 33.0 pg Final   • MCHC 12/03/2021 31.0 (A) 31.5 - 35.7 g/dL Final   • RDW 12/03/2021 14.4  12.3 - 15.4 % Final   • RDW-SD 12/03/2021 44.5  37.0 - 54.0 fl Final   • MPV 12/03/2021 12.3 (A) 6.0 - 12.0 fL Final   • Platelets 12/03/2021 250  140 - 450 10*3/mm3 Final   • Neutrophil % 12/03/2021 64.1  42.7 - 76.0 % Final   • Lymphocyte % 12/03/2021 27.7  19.6 - 45.3 % Final   • Monocyte % 12/03/2021 6.0  5.0 - 12.0 % Final   • Eosinophil % 12/03/2021 1.1  0.3 - 6.2 % Final   • Basophil % 12/03/2021 0.9  0.0 - 1.5 % Final   • Immature Grans % 12/03/2021 0.2  0.0 - 0.5 % Final   • Neutrophils, Absolute 12/03/2021 5.21  1.70 - 7.00 10*3/mm3 Final   • Lymphocytes, Absolute 12/03/2021 2.25  0.70 - 3.10 10*3/mm3 Final   • Monocytes, Absolute 12/03/2021 0.49  0.10 - 0.90 10*3/mm3 Final   • Eosinophils, Absolute 12/03/2021 0.09  0.00 - 0.40 10*3/mm3 Final   • Basophils, Absolute 12/03/2021 0.07  0.00 - 0.20 10*3/mm3 Final   • Immature Grans, Absolute 12/03/2021 0.02  0.00 - 0.05 10*3/mm3 Final   • nRBC 12/03/2021 0.0  0.0 - 0.2 /100 WBC Final   • Color, UA 12/03/2021 Yellow  Yellow, Straw Final   • Appearance, UA 12/03/2021 Clear  Clear Final   • pH, UA 12/03/2021 6.0  5.0 - 8.0 Final   • Specific Gravity, UA 12/03/2021 1.009  1.005 - 1.030 Final   • Glucose, UA 12/03/2021 Negative  Negative Final   • Ketones, UA 12/03/2021 Negative  Negative Final   • Bilirubin, UA 12/03/2021 Negative  Negative Final   • Blood, UA 12/03/2021 Trace (A) Negative Final   • Protein, UA 12/03/2021 Negative  Negative Final   • Leuk Esterase, UA 12/03/2021 Negative  Negative Final   • Nitrite, UA 12/03/2021 Negative  Negative Final   • Urobilinogen, UA 12/03/2021 0.2 E.U./dL  0.2 - 1.0 E.U./dL Final   • RBC, UA 12/03/2021 0-2  None Seen, 0-2 /HPF Final   • WBC, UA  12/03/2021 0-2  None Seen, 0-2 /HPF Final   • Bacteria, UA 12/03/2021 2+ (A) None Seen /HPF Final   • Squamous Epithelial Cells, UA 12/03/2021 3-6 (A) None Seen, 0-2 /HPF Final   • Hyaline Casts, UA 12/03/2021 0-2  None Seen /LPF Final   • Methodology 12/03/2021 Automated Microscopy   Final   • Interpretation 12/03/2021 Comment   Final    Negative  Not infected with HCV, unless recent infection is suspected or other  evidence exists to indicate HCV infection.      MR Cervical Spine w/o Contrast  See Historical Hospital Medical Record for documentation  MR Lumbar Spine w/o Contrast  See Historical Hospital Medical Record for documentation  CT Head w/o Contrast  Narrative: [facility]  HEAD CT WITHOUT IV CONTRAST  5/13/2015 12:41 AM    INDICATION: Headache  TECHNIQUE: Head CT without IV contrast.  COMPARISON: 03/06/2015    FINDINGS: Ventricles and sulci within normal limits. No acute intracranial hemorrhage, intracranial mass or CT evidence of recent infarct. The limited evaluation of the orbits and soft tissues of the skull base is unremarkable. The demonstrated   paranasal   sinuses and mastoid air cells are clear.  Impression: CONCLUSION:  1.  No acute intracranial pathology.  CT Abdomen Pelvis w/o Contrast  Narrative: CT ABDOMEN PELVIS WO ORAL WO IV CONTRAST  5/13/2015 12:42 AM    INDICATION: Upper abdominal pain and headache.  TECHNIQUE: Routine CT abdomen and pelvis without oral or IV contrast. Multiplanar reformation images (MPR).  COMPARISON: None.    FINDINGS:  LUNG BASES: Clear.    ABDOMEN: Focal fatty infiltration along falciform ligament liver. Spleen, Pancreas, adrenals grossly within normal limits on noncontrast imaging. Lobular contour of the kidneys. No hydronephrosis. Bowel is normal caliber.    PELVIS: Tiny fat-containing umbilical hernia. Normal appendix. Diverticulosis. Lobular contour the uterus presumably multiple fibroids.     MUSCULOSKELETAL: Degenerative change osseous structures.  Postsurgical change lumbosacral spine.  Impression: CONCLUSION:  1.  No inflammatory changes, bowel obstruction or abscess.  2.  No renal or ureteral calculi. No hydronephrosis.  3.  Diverticulosis.  4.  Normal appendix.  5.  Lobular contour of the uterus presumably multiple fibroids. Uterus not well characterized with CT.  US Pelvic Complete with Transvaginal  Narrative: US PELVIS WITH TRANSVAGINAL NON OB  5/15/2015 3:05 PM    INDICATION: Abdominal pain, nodular uterus on CT  TECHNIQUE: Transabdominal scans were performed. Endovaginal ultrasound was performed to better visualize the adnexa.  COMPARISON: CT 05/13/2015     FINDINGS:  Uterus measures 9.2 x 5 x 5.4 cm. There are at least 4 uterine fibroids. The largest measures 4 cm in diameter.    Endometrial thickness is 6 mm. Portions of the endometrium are obscured by the fibroids.    Right ovary measures 1.9 x 2.7 x 1.3 cm. Normal right ovary. Normal arterial duplex.    Left ovary is not visualized, and reportedly surgically absent.    No significant free fluid in the cul-de-sac.  Impression: CONCLUSION:  1.  Multiple uterine fibroids.  CT Head w/o Contrast  Narrative: [facility]  CT HEAD WO CONTRAST  7/15/2017 2:05 PM    INDICATION: Headache. Throat pain. Lymphadenopathy.   TECHNIQUE: Serial axial images were obtained through the brain without contrast. Dose reduction techniques were used.  CONTRAST: None.  SEDATION: None.  COMPARISON: MRI brain 2/17/2016.    FINDINGS: Ventricles, sulci, and cisterns are normal in appearance. Normal gray-white matter differentiation. No mass effect or midline shift.    Incidental note is made of a mildly expanded sella, similar to prior MRI. Calvarium is intact, without evidence of fracture or suspicious lytic or blastic foci. Visualized. Nasal sinuses, middle ear cavities, and mastoid air cells are essentially clear.     Orbits are unremarkable.  Impression: 1. No finding for intracranial hemorrhage, mass, or acute  infarct.  CT Soft Tissue Neck w Contrast  Narrative: [facility]  CT SOFT TISSUE NECK W CONTRAST  7/15/2017 2:06 PM    INDICATION: Throat pain.  TECHNIQUE: CT neck performed with IV contrast. Axial imaging with coronal and sagittal reconstruction. Dose reduction techniques were used.  IV CONTRAST: 100 mL Omnipaque 350  COMPARISON: None.    FINDINGS:  Mucosal surfaces of the upper aerodigestive tract are symmetrical and unremarkable, without discrete mass. The parapharyngeal and  spaces are unremarkable. The oral cavity and floor of mouth structures are symmetrical and   unremarkable. The parotid and submandibular glands are symmetrical and unremarkable. The larynx is unremarkable. Small bilateral cervical lymph nodes are noted; none are pathologic by size or morphology criteria.     Vascular structures of the neck are widely patent. Thyroid gland is mildly enlarged with multifocal subcentimeter low attenuating nodules. The included orbital and intracranial compartments are unremarkable. The visualized portions of the paranasal   sinuses are clear. The mastoid air cells and middle ear cavities are clear. Moderate multilevel degenerative changes in the cervical spine. The included lung apices are clear.  Impression: CONCLUSION:   1.  No cervical lymphadenopathy.  2.  Mild enlargement of the thyroid gland with multifocal thyroid nodules most consistent with multinodular goiter.  CT Abdomen Pelvis w Contrast  Narrative: EXAM: CT ABDOMEN PELVIS WO ORAL W IV CONTRAST  LOCATION: [facility]  DATE/TIME: 4/2/2019 1:11 PM    INDICATION: Left flank pain, LUQ, RLQ pain Left flank pain, LUQ, RLQ pain  COMPARISON: CT 03/01/2016  TECHNIQUE: Helical enhanced thin-section CT scan of the abdomen and pelvis was performed following injection of IV contrast. Multiplanar reformats were obtained. Dose reduction techniques were used.  CONTRAST: Iohexol (Omni) 100 mL    FINDINGS:   LUNG BASES: Negative.    ABDOMEN:  3.4 cm enhancing splenic lesion appears mildly larger when compared to 03/01/2016 and measure it at 3 cm. Part of this is due to different phase of enhancement. Most splenic lesions are benign and this does not have any significant concerning   features. Recommend follow-up with MRI 6 months to confirm stability.    Normal liver, pancreas, kidneys, and adrenal glands.    Moderate diverticulosis in the colon but nothing for acute diverticulitis.    PELVIS: Negative. No adnexal masses. No free fluid.    MUSCULOSKELETAL: Postop fusion lumbar spine. Moderate primary osteoarthritis both hips. No concerning bone lesion.  Impression: CONCLUSION:   1.  No acute findings in the abdomen and pelvis to explain the patient's pain.    2.  Moderate diverticulosis in the colon but nothing for acute diverticulitis.    3.  Likely incidental 3.4 cm hyperenhancing splenic lesion. Most likely benign but mildly larger since 03/01/2016. Recommend MRI of the abdomen in 6 months to confirm stability.    NOTE: ABNORMAL REPORT    THE DICTATION ABOVE DESCRIBES AN ABNORMALITY FOR WHICH FOLLOW-UP IS NEEDED.       [unfilled]  Immunization History   Administered Date(s) Administered   • Tdap 01/11/2018     The following portions of the patient's history were reviewed and updated as appropriate: allergies, current medications, past family history, past medical history, past social history, past surgical history and problem list.    PHQ-9 Total Score:           Physical Exam  Constitutional:       General: She is in acute distress.      Appearance: Normal appearance.   HENT:      Head: Normocephalic and atraumatic.      Right Ear: External ear normal.      Left Ear: External ear normal.   Eyes:      General:         Right eye: No discharge.         Left eye: No discharge.      Conjunctiva/sclera: Conjunctivae normal.   Cardiovascular:      Rate and Rhythm: Normal rate and regular rhythm.      Pulses: Normal pulses.      Heart sounds: Normal heart  sounds. No murmur heard.  Pulmonary:      Effort: Pulmonary effort is normal. No respiratory distress.      Breath sounds: Normal breath sounds.   Abdominal:      General: There is no distension.      Palpations: Abdomen is soft.      Tenderness: There is no abdominal tenderness.   Musculoskeletal:      Cervical back: Normal range of motion.      Right lower leg: No edema.      Left lower leg: No edema.      Comments: Patient's pain out of proportion with exam.  Pain with limited movement of left hip.   Lymphadenopathy:      Cervical: No cervical adenopathy.   Neurological:      Mental Status: She is alert. Mental status is at baseline.   Psychiatric:         Mood and Affect: Mood normal.         Assessment & Plan    Diagnosis Plan   1. High risk medication use  CBC & Differential    Comprehensive Metabolic Panel   2. Hip pain, bilateral  HYDROcodone-acetaminophen (Norco) 7.5-325 MG per tablet   3. Thyroid enlargement  TSH Rfx On Abnormal To Free T4      Orders Placed This Encounter   Procedures   • Comprehensive Metabolic Panel     Order Specific Question:   Release to patient     Answer:   Immediate   • TSH Rfx On Abnormal To Free T4     Order Specific Question:   Release to patient     Answer:   Immediate   • CBC Auto Differential   • CBC & Differential     Order Specific Question:   Manual Differential     Answer:   No     Chronic hip pain; continue to encourage weight loss, patient not a candidate for surgery at this time due to BMI.  We will continue with opiate medication as above, labs as above, UDS today.  No signs of abuse, misuse, will refill.  Follow-up in 2 months or sooner if needed.  Marcos reviewed and appropriate.        This document has been electronically signed by Santiago Quan MD on July 7, 2022 18:12 CDT    EMR Dragon/Transciption Disclaimer: Some of this note may be an electronic transcription/translation of spoken language to printed text.  The electronic translation of spoken language may  permit erroneous, or at times, nonsensical words or phrases to be inadvertently transcribed. Although I have reviewed the note for such errors, some may still exist.

## 2022-07-08 LAB
ALBUMIN SERPL-MCNC: 3.8 G/DL (ref 3.5–5.2)
ALBUMIN/GLOB SERPL: 1.1 G/DL
ALP SERPL-CCNC: 74 U/L (ref 39–117)
ALT SERPL W P-5'-P-CCNC: 23 U/L (ref 1–33)
AMPHET+METHAMPHET UR QL: NEGATIVE
AMPHETAMINES UR QL: NEGATIVE
ANION GAP SERPL CALCULATED.3IONS-SCNC: 11.8 MMOL/L (ref 5–15)
AST SERPL-CCNC: 24 U/L (ref 1–32)
BARBITURATES UR QL SCN: NEGATIVE
BASOPHILS # BLD AUTO: 0.06 10*3/MM3 (ref 0–0.2)
BASOPHILS NFR BLD AUTO: 0.9 % (ref 0–1.5)
BENZODIAZ UR QL SCN: NEGATIVE
BILIRUB SERPL-MCNC: 0.4 MG/DL (ref 0–1.2)
BUN SERPL-MCNC: 13 MG/DL (ref 8–23)
BUN/CREAT SERPL: 13.5 (ref 7–25)
BUPRENORPHINE SERPL-MCNC: NEGATIVE NG/ML
CALCIUM SPEC-SCNC: 9.1 MG/DL (ref 8.6–10.5)
CANNABINOIDS SERPL QL: POSITIVE
CHLORIDE SERPL-SCNC: 103 MMOL/L (ref 98–107)
CO2 SERPL-SCNC: 22.2 MMOL/L (ref 22–29)
COCAINE UR QL: NEGATIVE
CREAT SERPL-MCNC: 0.96 MG/DL (ref 0.57–1)
DEPRECATED RDW RBC AUTO: 42.2 FL (ref 37–54)
EGFRCR SERPLBLD CKD-EPI 2021: 67 ML/MIN/1.73
EOSINOPHIL # BLD AUTO: 0.07 10*3/MM3 (ref 0–0.4)
EOSINOPHIL NFR BLD AUTO: 1 % (ref 0.3–6.2)
ERYTHROCYTE [DISTWIDTH] IN BLOOD BY AUTOMATED COUNT: 14.2 % (ref 12.3–15.4)
GLOBULIN UR ELPH-MCNC: 3.6 GM/DL
GLUCOSE SERPL-MCNC: 91 MG/DL (ref 65–99)
HCT VFR BLD AUTO: 45.4 % (ref 34–46.6)
HGB BLD-MCNC: 14.2 G/DL (ref 12–15.9)
IMM GRANULOCYTES # BLD AUTO: 0.02 10*3/MM3 (ref 0–0.05)
IMM GRANULOCYTES NFR BLD AUTO: 0.3 % (ref 0–0.5)
LYMPHOCYTES # BLD AUTO: 2.02 10*3/MM3 (ref 0.7–3.1)
LYMPHOCYTES NFR BLD AUTO: 30 % (ref 19.6–45.3)
MCH RBC QN AUTO: 26.2 PG (ref 26.6–33)
MCHC RBC AUTO-ENTMCNC: 31.3 G/DL (ref 31.5–35.7)
MCV RBC AUTO: 83.8 FL (ref 79–97)
METHADONE UR QL SCN: NEGATIVE
MONOCYTES # BLD AUTO: 0.56 10*3/MM3 (ref 0.1–0.9)
MONOCYTES NFR BLD AUTO: 8.3 % (ref 5–12)
NEUTROPHILS NFR BLD AUTO: 4 10*3/MM3 (ref 1.7–7)
NEUTROPHILS NFR BLD AUTO: 59.5 % (ref 42.7–76)
NRBC BLD AUTO-RTO: 0 /100 WBC (ref 0–0.2)
OPIATES UR QL: NEGATIVE
OXYCODONE UR QL SCN: NEGATIVE
PCP UR QL SCN: NEGATIVE
PLATELET # BLD AUTO: 253 10*3/MM3 (ref 140–450)
PMV BLD AUTO: 11.4 FL (ref 6–12)
POTASSIUM SERPL-SCNC: 4.7 MMOL/L (ref 3.5–5.2)
PROPOXYPH UR QL: NEGATIVE
PROT SERPL-MCNC: 7.4 G/DL (ref 6–8.5)
RBC # BLD AUTO: 5.42 10*6/MM3 (ref 3.77–5.28)
SODIUM SERPL-SCNC: 137 MMOL/L (ref 136–145)
TRICYCLICS UR QL SCN: NEGATIVE
TSH SERPL DL<=0.05 MIU/L-ACNC: 0.79 UIU/ML (ref 0.27–4.2)
WBC NRBC COR # BLD: 6.73 10*3/MM3 (ref 3.4–10.8)

## 2022-07-13 ENCOUNTER — OFFICE VISIT (OUTPATIENT)
Dept: NEUROSURGERY | Facility: CLINIC | Age: 62
End: 2022-07-13

## 2022-07-13 VITALS
HEIGHT: 65 IN | SYSTOLIC BLOOD PRESSURE: 120 MMHG | WEIGHT: 286.2 LBS | BODY MASS INDEX: 47.68 KG/M2 | DIASTOLIC BLOOD PRESSURE: 90 MMHG

## 2022-07-13 DIAGNOSIS — F17.210 CIGARETTE SMOKER: ICD-10-CM

## 2022-07-13 DIAGNOSIS — R20.2 NUMBNESS AND TINGLING OF BOTH UPPER EXTREMITIES: ICD-10-CM

## 2022-07-13 DIAGNOSIS — M54.12 CERVICAL RADICULOPATHY: ICD-10-CM

## 2022-07-13 DIAGNOSIS — R20.0 NUMBNESS AND TINGLING OF BOTH UPPER EXTREMITIES: ICD-10-CM

## 2022-07-13 DIAGNOSIS — E66.01 CLASS 3 SEVERE OBESITY DUE TO EXCESS CALORIES WITH SERIOUS COMORBIDITY AND BODY MASS INDEX (BMI) OF 45.0 TO 49.9 IN ADULT: ICD-10-CM

## 2022-07-13 DIAGNOSIS — M54.2 CERVICALGIA: Primary | ICD-10-CM

## 2022-07-13 PROBLEM — F12.90 MARIJUANA SMOKER: Status: ACTIVE | Noted: 2022-07-13

## 2022-07-13 PROCEDURE — 99213 OFFICE O/P EST LOW 20 MIN: CPT | Performed by: NURSE PRACTITIONER

## 2022-07-13 NOTE — PATIENT INSTRUCTIONS
For more information:    Quit Now [place]  1-800-QUIT-NOW  https://[place].quitlogix.org/en-[place]/  Steps to Quit Smoking  Smoking tobacco can be harmful to your health and can affect almost every organ in your body. Smoking puts you, and those around you, at risk for developing many serious chronic diseases. Quitting smoking is difficult, but it is one of the best things that you can do for your health. It is never too late to quit.  What are the benefits of quitting smoking?  When you quit smoking, you lower your risk of developing serious diseases and conditions, such as:  Lung cancer or lung disease, such as COPD.  Heart disease.  Stroke.  Heart attack.  Infertility.  Osteoporosis and bone fractures.  Additionally, symptoms such as coughing, wheezing, and shortness of breath may get better when you quit. You may also find that you get sick less often because your body is stronger at fighting off colds and infections. If you are pregnant, quitting smoking can help to reduce your chances of having a baby of low birth weight.  How do I get ready to quit?  When you decide to quit smoking, create a plan to make sure that you are successful. Before you quit:  Pick a date to quit. Set a date within the next two weeks to give you time to prepare.  Write down the reasons why you are quitting. Keep this list in places where you will see it often, such as on your bathroom mirror or in your car or wallet.  Identify the people, places, things, and activities that make you want to smoke (triggers) and avoid them. Make sure to take these actions:  Throw away all cigarettes at home, at work, and in your car.  Throw away smoking accessories, such as ashtrays and lighters.  Clean your car and make sure to empty the ashtray.  Clean your home, including curtains and carpets.  Tell your family, friends, and coworkers that you are quitting. Support from your loved ones can make quitting easier.  Talk with your health care provider  about your options for quitting smoking.  Find out what treatment options are covered by your health insurance.  What strategies can I use to quit smoking?  Talk with your healthcare provider about different strategies to quit smoking. Some strategies include:  Quitting smoking altogether instead of gradually lessening how much you smoke over a period of time. Research shows that quitting “cold turkey” is more successful than gradually quitting.  Attending in-person counseling to help you build problem-solving skills. You are more likely to have success in quitting if you attend several counseling sessions. Even short sessions of 10 minutes can be effective.  Finding resources and support systems that can help you to quit smoking and remain smoke-free after you quit. These resources are most helpful when you use them often. They can include:  Online chats with a counselor.  Telephone quitlines.  Printed self-help materials.  Support groups or group counseling.  Text messaging programs.  Mobile phone applications.  Taking medicines to help you quit smoking. (If you are pregnant or breastfeeding, talk with your health care provider first.) Some medicines contain nicotine and some do not. Both types of medicines help with cravings, but the medicines that include nicotine help to relieve withdrawal symptoms. Your health care provider may recommend:  Nicotine patches, gum, or lozenges.  Nicotine inhalers or sprays.  Non-nicotine medicine that is taken by mouth.  Talk with your health care provider about combining strategies, such as taking medicines while you are also receiving in-person counseling. Using these two strategies together makes you more likely to succeed in quitting than if you used either strategy on its own.  If you are pregnant or breastfeeding, talk with your health care provider about finding counseling or other support strategies to quit smoking. Do not take medicine to help you quit smoking unless  told to do so by your health care provider.  What things can I do to make it easier to quit?  Quitting smoking might feel overwhelming at first, but there is a lot that you can do to make it easier. Take these important actions:  Reach out to your family and friends and ask that they support and encourage you during this time. Call telephone quitlines, reach out to support groups, or work with a counselor for support.  Ask people who smoke to avoid smoking around you.  Avoid places that trigger you to smoke, such as bars, parties, or smoke-break areas at work.  Spend time around people who do not smoke.  Lessen stress in your life, because stress can be a smoking trigger for some people. To lessen stress, try:  Exercising regularly.  Deep-breathing exercises.  Yoga.  Meditating.  Performing a body scan. This involves closing your eyes, scanning your body from head to toe, and noticing which parts of your body are particularly tense. Purposefully relax the muscles in those areas.  Download or purchase mobile phone or tablet apps (applications) that can help you stick to your quit plan by providing reminders, tips, and encouragement. There are many free apps, such as QuitGuide from the CDC (Centers for Disease Control and Prevention). You can find other support for quitting smoking (smoking cessation) through smokefree.gov and other websites.  How will I feel when I quit smoking?  Within the first 24 hours of quitting smoking, you may start to feel some withdrawal symptoms. These symptoms are usually most noticeable 2-3 days after quitting, but they usually do not last beyond 2-3 weeks. Changes or symptoms that you might experience include:  Mood swings.  Restlessness, anxiety, or irritation.  Difficulty concentrating.  Dizziness.  Strong cravings for sugary foods in addition to nicotine.  Mild weight gain.  Constipation.  Nausea.  Coughing or a sore throat.  Changes in how your medicines work in your body.  A  "depressed mood.  Difficulty sleeping (insomnia).  After the first 2-3 weeks of quitting, you may start to notice more positive results, such as:  Improved sense of smell and taste.  Decreased coughing and sore throat.  Slower heart rate.  Lower blood pressure.  Clearer skin.  The ability to breathe more easily.  Fewer sick days.  Quitting smoking is very challenging for most people. Do not get discouraged if you are not successful the first time. Some people need to make many attempts to quit before they achieve long-term success. Do your best to stick to your quit plan, and talk with your health care provider if you have any questions or concerns.  This information is not intended to replace advice given to you by your health care provider. Make sure you discuss any questions you have with your health care provider.  Document Released: 12/12/2002 Document Revised: 08/15/2017 Document Reviewed: 05/03/2016  [employer] Interactive Patient Education © 2017 [employer]  https://www.nhlbi.nih.gov/files/docs/public/heart/dash_brief.pdf\">   DASH Eating Plan  DASH stands for Dietary Approaches to Stop Hypertension. The DASH eating plan is a healthy eating plan that has been shown to:  Reduce high blood pressure (hypertension).  Reduce your risk for type 2 diabetes, heart disease, and stroke.  Help with weight loss.  What are tips for following this plan?  Reading food labels  Check food labels for the amount of salt (sodium) per serving. Choose foods with less than 5 percent of the Daily Value of sodium. Generally, foods with less than 300 milligrams (mg) of sodium per serving fit into this eating plan.  To find whole grains, look for the word \"whole\" as the first word in the ingredient list.  Shopping  Buy products labeled as \"low-sodium\" or \"no salt added.\"  Buy fresh foods. Avoid canned foods and pre-made or frozen meals.  Cooking  Avoid adding salt when cooking. Use salt-free seasonings or herbs instead of table salt or " sea salt. Check with your health care provider or pharmacist before using salt substitutes.  Do not mcdonald foods. Cook foods using healthy methods such as baking, boiling, grilling, roasting, and broiling instead.  Cook with heart-healthy oils, such as olive, canola, avocado, soybean, or sunflower oil.  Meal planning    Eat a balanced diet that includes:  4 or more servings of fruits and 4 or more servings of vegetables each day. Try to fill one-half of your plate with fruits and vegetables.  6-8 servings of whole grains each day.  Less than 6 oz (170 g) of lean meat, poultry, or fish each day. A 3-oz (85-g) serving of meat is about the same size as a deck of cards. One egg equals 1 oz (28 g).  2-3 servings of low-fat dairy each day. One serving is 1 cup (237 mL).  1 serving of nuts, seeds, or beans 5 times each week.  2-3 servings of heart-healthy fats. Healthy fats called omega-3 fatty acids are found in foods such as walnuts, flaxseeds, fortified milks, and eggs. These fats are also found in cold-water fish, such as sardines, salmon, and mackerel.  Limit how much you eat of:  Canned or prepackaged foods.  Food that is high in trans fat, such as some fried foods.  Food that is high in saturated fat, such as fatty meat.  Desserts and other sweets, sugary drinks, and other foods with added sugar.  Full-fat dairy products.  Do not salt foods before eating.  Do not eat more than 4 egg yolks a week.  Try to eat at least 2 vegetarian meals a week.  Eat more home-cooked food and less restaurant, buffet, and fast food.    Lifestyle  When eating at a restaurant, ask that your food be prepared with less salt or no salt, if possible.  If you drink alcohol:  Limit how much you use to:  0-1 drink a day for women who are not pregnant.  0-2 drinks a day for men.  Be aware of how much alcohol is in your drink. In the [place] one drink equals one 12 oz bottle of beer (355 mL), one 5 oz glass of wine (148 mL), or one 1½ oz glass of hard  liquor (44 mL).  General information  Avoid eating more than 2,300 mg of salt a day. If you have hypertension, you may need to reduce your sodium intake to 1,500 mg a day.  Work with your health care provider to maintain a healthy body weight or to lose weight. Ask what an ideal weight is for you.  Get at least 30 minutes of exercise that causes your heart to beat faster (aerobic exercise) most days of the week. Activities may include walking, swimming, or biking.  Work with your health care provider or dietitian to adjust your eating plan to your individual calorie needs.  What foods should I eat?  Fruits  All fresh, dried, or frozen fruit. Canned fruit in natural juice (without added sugar).  Vegetables  Fresh or frozen vegetables (raw, steamed, roasted, or grilled). Low-sodium or reduced-sodium tomato and vegetable juice. Low-sodium or reduced-sodium tomato sauce and tomato paste. Low-sodium or reduced-sodium canned vegetables.  Grains  Whole-grain or whole-wheat bread. Whole-grain or whole-wheat pasta. Brown rice. Oatmeal. Quinoa. Bulgur. Whole-grain and low-sodium cereals. Renetta bread. Low-fat, low-sodium crackers. Whole-wheat flour tortillas.  Meats and other proteins  Skinless chicken or turkey. Ground chicken or turkey. Pork with fat trimmed off. Fish and seafood. Egg whites. Dried beans, peas, or lentils. Unsalted nuts, nut butters, and seeds. Unsalted canned beans. Lean cuts of beef with fat trimmed off. Low-sodium, lean precooked or cured meat, such as sausages or meat loaves.  Dairy  Low-fat (1%) or fat-free (skim) milk. Reduced-fat, low-fat, or fat-free cheeses. Nonfat, low-sodium ricotta or cottage cheese. Low-fat or nonfat yogurt. Low-fat, low-sodium cheese.  Fats and oils  Soft margarine without trans fats. Vegetable oil. Reduced-fat, low-fat, or light mayonnaise and salad dressings (reduced-sodium). Canola, safflower, olive, avocado, soybean, and sunflower oils. Avocado.  Seasonings and  condiments  Herbs. Spices. Seasoning mixes without salt.  Other foods  Unsalted popcorn and pretzels. Fat-free sweets.  The items listed above may not be a complete list of foods and beverages you can eat. Contact a dietitian for more information.  What foods should I avoid?  Fruits  Canned fruit in a light or heavy syrup. Fried fruit. Fruit in cream or butter sauce.  Vegetables  Creamed or fried vegetables. Vegetables in a cheese sauce. Regular canned vegetables (not low-sodium or reduced-sodium). Regular canned tomato sauce and paste (not low-sodium or reduced-sodium). Regular tomato and vegetable juice (not low-sodium or reduced-sodium). Pickles. Olives.  Grains  Baked goods made with fat, such as croissants, muffins, or some breads. Dry pasta or rice meal packs.  Meats and other proteins  Fatty cuts of meat. Ribs. Fried meat. Castro. Bologna, salami, and other precooked or cured meats, such as sausages or meat loaves. Fat from the back of a pig (fatback). Bratwurst. Salted nuts and seeds. Canned beans with added salt. Canned or smoked fish. Whole eggs or egg yolks. Chicken or turkey with skin.  Dairy  Whole or 2% milk, cream, and half-and-half. Whole or full-fat cream cheese. Whole-fat or sweetened yogurt. Full-fat cheese. Nondairy creamers. Whipped toppings. Processed cheese and cheese spreads.  Fats and oils  Butter. Stick margarine. Lard. Shortening. Ghee. Castro fat. Tropical oils, such as coconut, palm kernel, or palm oil.  Seasonings and condiments  Onion salt, garlic salt, seasoned salt, table salt, and sea salt. Worcestershire sauce. Tartar sauce. Barbecue sauce. Teriyaki sauce. Soy sauce, including reduced-sodium. Steak sauce. Canned and packaged gravies. Fish sauce. Oyster sauce. Cocktail sauce. Store-bought horseradish. Ketchup. Mustard. Meat flavorings and tenderizers. Bouillon cubes. Hot sauces. Pre-made or packaged marinades. Pre-made or packaged taco seasonings. Relishes. Regular salad  dressings.  Other foods  Salted popcorn and pretzels.  The items listed above may not be a complete list of foods and beverages you should avoid. Contact a dietitian for more information.  Where to find more information  National Heart, Lung, and Blood [place]: www.nhlbi.nih.gov  American Heart Association: www.heart.org  Academy of Nutrition and Dietetics: www.eatright.org  National Kidney Foundation: www.kidney.org  Summary  The DASH eating plan is a healthy eating plan that has been shown to reduce high blood pressure (hypertension). It may also reduce your risk for type 2 diabetes, heart disease, and stroke.  When on the DASH eating plan, aim to eat more fresh fruits and vegetables, whole grains, lean proteins, low-fat dairy, and heart-healthy fats.  With the DASH eating plan, you should limit salt (sodium) intake to 2,300 mg a day. If you have hypertension, you may need to reduce your sodium intake to 1,500 mg a day.  Work with your health care provider or dietitian to adjust your eating plan to your individual calorie needs.  This information is not intended to replace advice given to you by your health care provider. Make sure you discuss any questions you have with your health care provider.  Document Revised: 11/20/2020 Document Reviewed: 11/20/2020  Elsevier Patient Education © 2021 [employer]
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
## 2022-07-18 ENCOUNTER — TELEPHONE (OUTPATIENT)
Dept: FAMILY MEDICINE CLINIC | Facility: CLINIC | Age: 62
End: 2022-07-18

## 2022-07-20 ENCOUNTER — TELEPHONE (OUTPATIENT)
Dept: NEUROSURGERY | Facility: CLINIC | Age: 62
End: 2022-07-20
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
## 2022-07-21 ENCOUNTER — OFFICE VISIT (OUTPATIENT)
Dept: ORTHOPEDIC SURGERY | Facility: CLINIC | Age: 62
End: 2022-07-21

## 2022-07-21 VITALS — BODY MASS INDEX: 47.65 KG/M2 | WEIGHT: 286 LBS | HEIGHT: 65 IN

## 2022-07-21 DIAGNOSIS — M16.11 PRIMARY OSTEOARTHRITIS OF RIGHT HIP: ICD-10-CM

## 2022-07-21 DIAGNOSIS — E66.01 MORBID OBESITY WITH BMI OF 45.0-49.9, ADULT: ICD-10-CM

## 2022-07-21 DIAGNOSIS — I10 ESSENTIAL HYPERTENSION: ICD-10-CM

## 2022-07-21 DIAGNOSIS — M25.552 LEFT HIP PAIN: ICD-10-CM

## 2022-07-21 DIAGNOSIS — J45.20 MILD INTERMITTENT ASTHMA WITHOUT COMPLICATION: ICD-10-CM

## 2022-07-21 DIAGNOSIS — M16.12 PRIMARY OSTEOARTHRITIS OF LEFT HIP: Primary | ICD-10-CM

## 2022-07-21 DIAGNOSIS — K21.9 GERD WITHOUT ESOPHAGITIS: ICD-10-CM

## 2022-07-21 PROCEDURE — 99213 OFFICE O/P EST LOW 20 MIN: CPT | Performed by: ORTHOPAEDIC SURGERY

## 2022-07-21 NOTE — PROGRESS NOTES
"Norah Urbina is a 61 y.o. female returns for     Chief Complaint   Patient presents with   • Left Hip - Follow-up       HISTORY OF PRESENT ILLNESS:   Patient is here today for follow up of left hip pain.   Patient has pain in both hips.  Left hip is worse than the right hip.  She reports pain all the time.  She has pain on the lateral side and into her groin.  Anti-inflammatory medicines did not help and physical therapy made the pain worse.  She uses a Rollator/walker.  She reports pain with activities of daily living and is unhappy with her quality of life.     CONCURRENT MEDICAL HISTORY:    The following portions of the patient's history were reviewed and updated as appropriate: allergies, current medications, past family history, past medical history, past social history, past surgical history and problem list.     ROS  No fevers or chills.  No chest pain or shortness of air.  No GI or  disturbances.    PHYSICAL EXAMINATION:       Ht 165.1 cm (65\")   Wt 130 kg (286 lb)   BMI 47.59 kg/m²     Physical Exam  Constitutional:       General: She is not in acute distress.     Appearance: Normal appearance.   Pulmonary:      Effort: Pulmonary effort is normal. No respiratory distress.   Neurological:      Mental Status: She is alert and oriented to person, place, and time.         GAIT:     []  Normal  []  Antalgic    Assistive device: []  None  []  Walker     []  Crutches  []  Cane     []  Wheelchair  []  Stretcher    Right Hip Exam     Range of Motion   Flexion: 110   External rotation: 20   Internal rotation: 0     Muscle Strength   Flexion: 4/5     Tests   LUANN: positive  Fadir:  Positive FADIR test    Other   Erythema: absent  Sensation: normal  Pulse: present      Left Hip Exam     Tenderness   Left hip tenderness location: diffuse.    Range of Motion   Flexion: 110   External rotation: 20   Internal rotation: 0     Muscle Strength   Flexion: 4/5     Tests   LUANN: positive  Fadir:  Positive FADIR " test    Other   Erythema: absent  Sensation: normal  Pulse: present    Comments:  + logroll test  + stinchfield test                XR Hips Bilateral With or Without Pelvis 2 View  Order date: 4/28/2022  Authorizing: Santiago Quan MD  Ordered by Santiago Quan MD on 4/28/2022.       Narrative & Impression       Two view bilateral hips with single view pelvis     HISTORY: Chronic hip pain. Left hip pain.     AP and frog-leg lateral views of each hip and AP film of the  pelvis obtained.     COMPARISON: None     FINDINGS:   No fracture or dislocation.  Some narrowing of each hip joint centrally.  Subchondral cysts left femoral head.  Pedicle screws and rods lumbar spine and upper sacrum.  Minimal degenerative change each iliac crest.  No other osseous or articular abnormality.     IMPRESSION:  CONCLUSION:  Some narrowing of each hip joint centrally.  Subchondral cysts left femoral head.  Pedicle screws and rods lumbar spine and upper sacrum.  Minimal degenerative change each iliac crest.     [id number]     Electronically signed by:  Genaro Greer MD  4/30/2022 5:26 PM CDT  Workstation: [id number]             ASSESSMENT:    Diagnoses and all orders for this visit:    Primary osteoarthritis of left hip    Left hip pain    Primary osteoarthritis of right hip    Morbid obesity with BMI of 45.0-49.9, adult (HCC)    GERD without esophagitis    Essential hypertension    Mild intermittent asthma without complication          PLAN    She has osteoarthritis in both hips.  She uses a Rollator for activities of daily living.  She has tried physical therapy and anti-inflammatory medication.  Long discussion was held with the patient regarding patient controlled risk factors.  She has body mass index over 45.  We discussed improving body mass index and target weight.  We discussed the importance of patient control risk factors.  She will continue with weight management and general strength and conditioning exercises.  Follow-up as  needed.    Return if symptoms worsen or fail to improve, for recheck.    Jean Hanson MD

## 2022-07-29 DIAGNOSIS — M25.552 HIP PAIN, BILATERAL: ICD-10-CM

## 2022-07-29 DIAGNOSIS — M25.551 HIP PAIN, BILATERAL: ICD-10-CM

## 2022-07-29 RX ORDER — HYDROCODONE BITARTRATE AND ACETAMINOPHEN 7.5; 325 MG/1; MG/1
1 TABLET ORAL 2 TIMES DAILY PRN
Qty: 60 TABLET | Refills: 0 | Status: SHIPPED | OUTPATIENT
Start: 2022-08-05

## 2022-08-01 ENCOUNTER — TELEPHONE (OUTPATIENT)
Dept: FAMILY MEDICINE CLINIC | Facility: CLINIC | Age: 62
End: 2022-08-01

## 2022-08-01 NOTE — TELEPHONE ENCOUNTER
Patient called stated she needs a call back about one of her prescriptions. She does not remember the name of it.

## 2022-08-01 NOTE — TELEPHONE ENCOUNTER
Patient called asking about her [place]. I told her that Dr Quan sent it in and that it will be available for  on 8/5/22. She voiced understanding.

## 2022-11-17 ENCOUNTER — OFFICE VISIT (OUTPATIENT)
Dept: NEUROSURGERY | Facility: CLINIC | Age: 62
End: 2022-11-17

## 2022-11-17 VITALS — HEIGHT: 65 IN | WEIGHT: 284.4 LBS | BODY MASS INDEX: 47.38 KG/M2

## 2022-11-17 DIAGNOSIS — M54.12 CERVICAL RADICULOPATHY: ICD-10-CM

## 2022-11-17 DIAGNOSIS — R20.2 NUMBNESS AND TINGLING OF BOTH UPPER EXTREMITIES: ICD-10-CM

## 2022-11-17 DIAGNOSIS — M50.30 DEGENERATION OF CERVICAL DISC WITHOUT MYELOPATHY: Primary | ICD-10-CM

## 2022-11-17 DIAGNOSIS — F17.210 CIGARETTE SMOKER: ICD-10-CM

## 2022-11-17 DIAGNOSIS — E66.01 CLASS 3 SEVERE OBESITY DUE TO EXCESS CALORIES WITH SERIOUS COMORBIDITY AND BODY MASS INDEX (BMI) OF 45.0 TO 49.9 IN ADULT: ICD-10-CM

## 2022-11-17 DIAGNOSIS — R20.0 NUMBNESS AND TINGLING OF BOTH UPPER EXTREMITIES: ICD-10-CM

## 2022-11-17 PROCEDURE — 99213 OFFICE O/P EST LOW 20 MIN: CPT | Performed by: NEUROLOGICAL SURGERY

## 2022-11-17 NOTE — PROGRESS NOTES
SUBJECTIVE:  Patient ID: Norah Urbina is a 62 y.o. female is here today for follow-up.    Chief Complaint: Neck pain  Chief Complaint   Patient presents with   • NECK & ARM PAIN     Patient with complaints of constant neck pain and intermittent BUE pain along w/N/T.  She did therapy in July 2022 w/o improvement of her symptoms.  She has an appt with pain mgmt ([place]) in 2/2023.  Patient had an EMG 3/29/22 ([facility]).       HPI  62-year-old female presents with long history of neck pain, bilateral upper extremity pain and numbness in the hands.  She has had both shoulders operated on in the past.  She has had bilateral cubital tunnel release.  She has had multiple lumbar spine surgeries.  She still complains of chronic back pain, neck pain, headache.  She did a dedicated course of physical therapy for the neck issues without any improvement.  She has had injection treatments in [place] in the past for her neck pain.  She is scheduled to follow-up with a pain management specialist in [place] as a new patient.  She takes hydrocodone for the chronic pain issues.  She is on a walker because of the back pain since her last surgery and about 20 years ago.    The following portions of the patient's history were reviewed and updated as appropriate: allergies, current medications, past family history, past medical history, past social history, past surgical history and problem list.    OBJECTIVE:    Review of Systems   Musculoskeletal: Positive for arthralgias, back pain, gait problem, myalgias, neck pain and neck stiffness.   Neurological: Positive for numbness.   All other systems reviewed and are negative.         Physical Exam  Eyes:      Extraocular Movements: EOM normal.      Pupils: Pupils are equal, round, and reactive to light.   Neurological:      Mental Status: She is oriented to person, place, and time.      Motor: Motor strength is normal.      Coordination: Finger-Nose-Finger Test normal.       Gait: Gait is intact.   Psychiatric:         Speech: Speech normal.         Neurologic Exam     Mental Status   Oriented to person, place, and time.   Attention: normal.   Speech: speech is normal   Level of consciousness: alert  Knowledge: good.     Cranial Nerves     CN II   Visual fields full to confrontation.     CN III, IV, VI   Pupils are equal, round, and reactive to light.  Extraocular motions are normal.     CN V   Facial sensation intact.     CN VII   Facial expression full, symmetric.     CN VIII   CN VIII normal.     CN IX, X   CN IX normal.   CN X normal.     CN XI   CN XI normal.     CN XII   CN XII normal.     Motor Exam   Muscle bulk: normal  Overall muscle tone: normal  Right arm pronator drift: absent  Left arm pronator drift: absent    Strength   Strength 5/5 throughout.     Sensory Exam   Light touch normal.   Pinprick normal.     Gait, Coordination, and Reflexes     Gait  Gait: normal    Coordination   Finger to nose coordination: normal    Tremor   Resting tremor: absent  Intention tremor: absent  Action tremor: absent    Reflexes   Reflexes 2+ except as noted.       Independent Review of Radiographic Studies:   MRI of the cervical spine shows multilevel spondylosis with some element of foraminal stenosis.  There is no cord compression.    ASSESSMENT/PLAN:  The patient has had chronic neck pain and some arm pain and numbness.  Her MRI shows multilevel spondylosis.  Any consideration of surgery would either be a 3 or 4 level cervical fusion.  I think even with this operation the patient would still have chronic neck and arm issues.  I do not think aggressive surgical intervention would be in her best interest at this point.  I would recommend she continue to follow-up with the pain management specialist in [place] to optimize control of her symptoms.      1. Degeneration of cervical disc without myelopathy    2. Cervical radiculopathy    3. Numbness and tingling of both upper extremities     4. Cigarette smoker    5. Class 3 severe obesity due to excess calories with serious comorbidity and body mass index (BMI) of 45.0 to 49.9 in adult (HCC)        The patient's Body mass index is 47.33 kg/m².. BMI is above normal parameters. Recommendations include: educational material    Return if symptoms worsen or fail to improve.      Carlos Mejía MD

## 2022-11-17 NOTE — PATIENT INSTRUCTIONS
"PATIENT TO CONTINUE TO FOLLOW UP WITH HER PRIMARY CARE PROVIDER FOR YEARLY PHYSICAL EXAMS TO ENSURE COMPLETE HEALTH MAINTENANCE    BMI for Adults  What is BMI?  Body mass index (BMI) is a number that is calculated from a person's weight and height. BMI can help estimate how much of a person's weight is composed of fat. BMI does not measure body fat directly. Rather, it is an alternative to procedures that directly measure body fat, which can be difficult and expensive.  BMI can help identify people who may be at higher risk for certain medical problems.  What are BMI measurements used for?  BMI is used as a screening tool to identify possible weight problems. It helps determine whether a person is obese, overweight, a healthy weight, or underweight.  BMI is useful for:  Identifying a weight problem that may be related to a medical condition or may increase the risk for medical problems.  Promoting changes, such as changes in diet and exercise, to help reach a healthy weight. BMI screening can be repeated to see if these changes are working.  How is BMI calculated?  BMI involves measuring your weight in relation to your height. Both height and weight are measured, and the BMI is calculated from those numbers. This can be done either in English ([place] or metric measurements. Note that charts and online BMI calculators are available to help you find your BMI quickly and easily without having to do these calculations yourself.  To calculate your BMI in English ([place] measurements:    Measure your weight in pounds (lb).  Multiply the number of pounds by 703.  For example, for a person who weighs 180 lb, multiply that number by 703, which equals 126,540.  Measure your height in inches. Then multiply that number by itself to get a measurement called \"inches squared.\"  For example, for a person who is 70 inches tall, the \"inches squared\" measurement is 70 inches x 70 inches, which equals 4,900 inches squared.  Divide the " "total from step 2 (number of lb x 703) by the total from step 3 (inches squared): 126,540 ÷ 4,900 = 25.8. This is your BMI.  To calculate your BMI in metric measurements:  Measure your weight in kilograms (kg).  Measure your height in meters (m). Then multiply that number by itself to get a measurement called \"meters squared.\"  For example, for a person who is 1.75 m tall, the \"meters squared\" measurement is 1.75 m x 1.75 m, which is equal to 3.1 meters squared.  Divide the number of kilograms (your weight) by the meters squared number. In this example: 70 ÷ 3.1 = 22.6. This is your BMI.  What do the results mean?  BMI charts are used to identify whether you are underweight, normal weight, overweight, or obese. The following guidelines will be used:  Underweight: BMI less than 18.5.  Normal weight: BMI between 18.5 and 24.9.  Overweight: BMI between 25 and 29.9.  Obese: BMI of 30 or above.  Keep these notes in mind:  Weight includes both fat and muscle, so someone with a muscular build, such as an athlete, may have a BMI that is higher than 24.9. In cases like these, BMI is not an accurate measure of body fat.  To determine if excess body fat is the cause of a BMI of 25 or higher, further assessments may need to be done by a health care provider.  BMI is usually interpreted in the same way for men and women.  Where to find more information  For more information about BMI, including tools to quickly calculate your BMI, go to these websites:  Centers for Disease Control and Prevention: www.cdc.gov  American Heart Association: www.heart.org  National Heart, Lung, and Blood [place]: www.nhlbi.nih.gov  Summary  Body mass index (BMI) is a number that is calculated from a person's weight and height.  BMI may help estimate how much of a person's weight is composed of fat. BMI can help identify those who may be at higher risk for certain medical problems.  BMI can be measured using English measurements or metric " "measurements.  BMI charts are used to identify whether you are underweight, normal weight, overweight, or obese.  This information is not intended to replace advice given to you by your health care provider. Make sure you discuss any questions you have with your health care provider.  Document Revised: 09/09/2020 Document Reviewed: 07/17/2020  [employer] Patient Education © 2022 [employer]  DASH Eating Plan  DASH stands for Dietary Approaches to Stop Hypertension. The DASH eating plan is a healthy eating plan that has been shown to:  Reduce high blood pressure (hypertension).  Reduce your risk for type 2 diabetes, heart disease, and stroke.  Help with weight loss.  What are tips for following this plan?  Reading food labels  Check food labels for the amount of salt (sodium) per serving. Choose foods with less than 5 percent of the Daily Value of sodium. Generally, foods with less than 300 milligrams (mg) of sodium per serving fit into this eating plan.  To find whole grains, look for the word \"whole\" as the first word in the ingredient list.  Shopping  Buy products labeled as \"low-sodium\" or \"no salt added.\"  Buy fresh foods. Avoid canned foods and pre-made or frozen meals.  Cooking  Avoid adding salt when cooking. Use salt-free seasonings or herbs instead of table salt or sea salt. Check with your health care provider or pharmacist before using salt substitutes.  Do not mcdonald foods. Cook foods using healthy methods such as baking, boiling, grilling, roasting, and broiling instead.  Cook with heart-healthy oils, such as olive, canola, avocado, soybean, or sunflower oil.  Meal planning    Eat a balanced diet that includes:  4 or more servings of fruits and 4 or more servings of vegetables each day. Try to fill one-half of your plate with fruits and vegetables.  6-8 servings of whole grains each day.  Less than 6 oz (170 g) of lean meat, poultry, or fish each day. A 3-oz (85-g) serving of meat is about the same size as " a deck of cards. One egg equals 1 oz (28 g).  2-3 servings of low-fat dairy each day. One serving is 1 cup (237 mL).  1 serving of nuts, seeds, or beans 5 times each week.  2-3 servings of heart-healthy fats. Healthy fats called omega-3 fatty acids are found in foods such as walnuts, flaxseeds, fortified milks, and eggs. These fats are also found in cold-water fish, such as sardines, salmon, and mackerel.  Limit how much you eat of:  Canned or prepackaged foods.  Food that is high in trans fat, such as some fried foods.  Food that is high in saturated fat, such as fatty meat.  Desserts and other sweets, sugary drinks, and other foods with added sugar.  Full-fat dairy products.  Do not salt foods before eating.  Do not eat more than 4 egg yolks a week.  Try to eat at least 2 vegetarian meals a week.  Eat more home-cooked food and less restaurant, buffet, and fast food.  Lifestyle  When eating at a restaurant, ask that your food be prepared with less salt or no salt, if possible.  If you drink alcohol:  Limit how much you use to:  0-1 drink a day for women who are not pregnant.  0-2 drinks a day for men.  Be aware of how much alcohol is in your drink. In the [place] one drink equals one 12 oz bottle of beer (355 mL), one 5 oz glass of wine (148 mL), or one 1½ oz glass of hard liquor (44 mL).  General information  Avoid eating more than 2,300 mg of salt a day. If you have hypertension, you may need to reduce your sodium intake to 1,500 mg a day.  Work with your health care provider to maintain a healthy body weight or to lose weight. Ask what an ideal weight is for you.  Get at least 30 minutes of exercise that causes your heart to beat faster (aerobic exercise) most days of the week. Activities may include walking, swimming, or biking.  Work with your health care provider or dietitian to adjust your eating plan to your individual calorie needs.  What foods should I eat?  Fruits  All fresh, dried, or frozen fruit. Canned  fruit in natural juice (without added sugar).  Vegetables  Fresh or frozen vegetables (raw, steamed, roasted, or grilled). Low-sodium or reduced-sodium tomato and vegetable juice. Low-sodium or reduced-sodium tomato sauce and tomato paste. Low-sodium or reduced-sodium canned vegetables.  Grains  Whole-grain or whole-wheat bread. Whole-grain or whole-wheat pasta. Brown rice. Oatmeal. Quinoa. Bulgur. Whole-grain and low-sodium cereals. Renetta bread. Low-fat, low-sodium crackers. Whole-wheat flour tortillas.  Meats and other proteins  Skinless chicken or turkey. Ground chicken or turkey. Pork with fat trimmed off. Fish and seafood. Egg whites. Dried beans, peas, or lentils. Unsalted nuts, nut butters, and seeds. Unsalted canned beans. Lean cuts of beef with fat trimmed off. Low-sodium, lean precooked or cured meat, such as sausages or meat loaves.  Dairy  Low-fat (1%) or fat-free (skim) milk. Reduced-fat, low-fat, or fat-free cheeses. Nonfat, low-sodium ricotta or cottage cheese. Low-fat or nonfat yogurt. Low-fat, low-sodium cheese.  Fats and oils  Soft margarine without trans fats. Vegetable oil. Reduced-fat, low-fat, or light mayonnaise and salad dressings (reduced-sodium). Canola, safflower, olive, avocado, soybean, and sunflower oils. Avocado.  Seasonings and condiments  Herbs. Spices. Seasoning mixes without salt.  Other foods  Unsalted popcorn and pretzels. Fat-free sweets.  The items listed above may not be a complete list of foods and beverages you can eat. Contact a dietitian for more information.  What foods should I avoid?  Fruits  Canned fruit in a light or heavy syrup. Fried fruit. Fruit in cream or butter sauce.  Vegetables  Creamed or fried vegetables. Vegetables in a cheese sauce. Regular canned vegetables (not low-sodium or reduced-sodium). Regular canned tomato sauce and paste (not low-sodium or reduced-sodium). Regular tomato and vegetable juice (not low-sodium or reduced-sodium). Pickles.  Olives.  Grains  Baked goods made with fat, such as croissants, muffins, or some breads. Dry pasta or rice meal packs.  Meats and other proteins  Fatty cuts of meat. Ribs. Fried meat. Castro. Bologna, salami, and other precooked or cured meats, such as sausages or meat loaves. Fat from the back of a pig (fatback). Bratwurst. Salted nuts and seeds. Canned beans with added salt. Canned or smoked fish. Whole eggs or egg yolks. Chicken or turkey with skin.  Dairy  Whole or 2% milk, cream, and half-and-half. Whole or full-fat cream cheese. Whole-fat or sweetened yogurt. Full-fat cheese. Nondairy creamers. Whipped toppings. Processed cheese and cheese spreads.  Fats and oils  Butter. Stick margarine. Lard. Shortening. Ghee. Castro fat. Tropical oils, such as coconut, palm kernel, or palm oil.  Seasonings and condiments  Onion salt, garlic salt, seasoned salt, table salt, and sea salt. [place] sauce. Tartar sauce. Barbecue sauce. Teriyaki sauce. Soy sauce, including reduced-sodium. Steak sauce. Canned and packaged gravies. Fish sauce. Oyster sauce. Cocktail sauce. Store-bought horseradish. Ketchup. Mustard. Meat flavorings and tenderizers. Bouillon cubes. Hot sauces. Pre-made or packaged marinades. Pre-made or packaged taco seasonings. Relishes. Regular salad dressings.  Other foods  Salted popcorn and pretzels.  The items listed above may not be a complete list of foods and beverages you should avoid. Contact a dietitian for more information.  Where to find more information  National Heart, Lung, and Blood [place]: www.nhlbi.nih.gov  American Heart Association: www.heart.org  Academy of Nutrition and Dietetics: www.eatright.org  National Kidney Foundation: www.kidney.org  Summary  The DASH eating plan is a healthy eating plan that has been shown to reduce high blood pressure (hypertension). It may also reduce your risk for type 2 diabetes, heart disease, and stroke.  When on the DASH eating plan, aim to eat more  fresh fruits and vegetables, whole grains, lean proteins, low-fat dairy, and heart-healthy fats.  With the DASH eating plan, you should limit salt (sodium) intake to 2,300 mg a day. If you have hypertension, you may need to reduce your sodium intake to 1,500 mg a day.  Work with your health care provider or dietitian to adjust your eating plan to your individual calorie needs.  This information is not intended to replace advice given to you by your health care provider. Make sure you discuss any questions you have with your health care provider.  Document Revised: 11/20/2020 Document Reviewed: 11/20/2020  [employer] Patient Education © 2022 [employer]  Steps to Quit Smoking  Smoking tobacco is the leading cause of preventable death. It can affect almost every organ in the body. Smoking puts you and those around you at risk for developing many serious chronic diseases. Quitting smoking can be difficult, but it is one of the best things that you can do for your health. It is never too late to quit.  How do I get ready to quit?  When you decide to quit smoking, create a plan to help you succeed. Before you quit:  Pick a date to quit. Set a date within the next 2 weeks to give you time to prepare.  Write down the reasons why you are quitting. Keep this list in places where you will see it often.  Tell your family, friends, and co-workers that you are quitting. Support from your loved ones can make quitting easier.  Talk with your health care provider about your options for quitting smoking.  Find out what treatment options are covered by your health insurance.  Identify people, places, things, and activities that make you want to smoke (triggers). Avoid them.  What first steps can I take to quit smoking?  Throw away all cigarettes at home, at work, and in your car.  Throw away smoking accessories, such as ashtrays and lighters.  Clean your car. Make sure to empty the ashtray.  Clean your home, including curtains and  carpets.  What strategies can I use to quit smoking?  Talk with your health care provider about combining strategies, such as taking medicines while you are also receiving in-person counseling. Using these two strategies together makes you more likely to succeed in quitting than if you used either strategy on its own.  If you are pregnant or breastfeeding, talk with your health care provider about finding counseling or other support strategies to quit smoking. Do not take medicine to help you quit smoking unless your health care provider tells you to do so.  To quit smoking:  Quit right away  Quit smoking completely, instead of gradually reducing how much you smoke over a period of time. Research shows that stopping smoking right away is more successful than gradually quitting.  Attend in-person counseling to help you build problem-solving skills. You are more likely to succeed in quitting if you attend counseling sessions regularly. Even short sessions of 10 minutes can be effective.  Take medicine  You may take medicines to help you quit smoking. Some medicines require a prescription and some you can purchase over-the-counter. Medicines may have nicotine in them to replace the nicotine in cigarettes. Medicines may:  Help to stop cravings.  Help to relieve withdrawal symptoms.  Your health care provider may recommend:  Nicotine patches, gum, or lozenges.  Nicotine inhalers or sprays.  Non-nicotine medicine that is taken by mouth.  Find resources  Find resources and support systems that can help you to quit smoking and remain smoke-free after you quit. These resources are most helpful when you use them often. They include:  Online chats with a counselor.  Telephone quitlines.  Printed self-help materials.  Support groups or group counseling.  Text messaging programs.  Mobile phone apps or applications. Use apps that can help you stick to your quit plan by providing reminders, tips, and encouragement. There are many  free apps for mobile devices as well as websites. Examples include Quit Guide from the CDC and smokefree.gov  What things can I do to make it easier to quit?    Reach out to your family and friends for support and encouragement. Call telephone quitlines (9-629-QUIT-NOW), reach out to support groups, or work with a counselor for support.  Ask people who smoke to avoid smoking around you.  Avoid places that trigger you to smoke, such as bars, parties, or smoke-break areas at work.  Spend time with people who do not smoke.  Lessen the stress in your life. Stress can be a smoking trigger for some people. To lessen stress, try:  Exercising regularly.  Doing deep-breathing exercises.  Doing yoga.  Meditating.  Performing a body scan. This involves closing your eyes, scanning your body from head to toe, and noticing which parts of your body are particularly tense. Try to relax the muscles in those areas.  How will I feel when I quit smoking?  Day 1 to 3 weeks  Within the first 24 hours of quitting smoking, you may start to feel withdrawal symptoms. These symptoms are usually most noticeable 2-3 days after quitting, but they usually do not last for more than 2-3 weeks. You may experience these symptoms:  Mood swings.  Restlessness, anxiety, or irritability.  Trouble concentrating.  Dizziness.  Strong cravings for sugary foods and nicotine.  Mild weight gain.  Constipation.  Nausea.  Coughing or a sore throat.  Changes in how the medicines that you take for unrelated issues work in your body.  Depression.  Trouble sleeping (insomnia).  Week 3 and afterward  After the first 2-3 weeks of quitting, you may start to notice more positive results, such as:  Improved sense of smell and taste.  Decreased coughing and sore throat.  Slower heart rate.  Lower blood pressure.  Clearer skin.  The ability to breathe more easily.  Fewer sick days.  Quitting smoking can be very challenging. Do not get discouraged if you are not successful  the first time. Some people need to make many attempts to quit before they achieve long-term success. Do your best to stick to your quit plan, and talk with your health care provider if you have any questions or concerns.  Summary  Smoking tobacco is the leading cause of preventable death. Quitting smoking is one of the best things that you can do for your health.  When you decide to quit smoking, create a plan to help you succeed.  Quit smoking right away, not slowly over a period of time.  When you start quitting, seek help from your health care provider, family, or friends.  This information is not intended to replace advice given to you by your health care provider. Make sure you discuss any questions you have with your health care provider.  Document Revised: 08/26/2022 Document Reviewed: 03/07/2020  [employer] Patient Education © 2022 [employer]  Tobacco Use Disorder  Tobacco use disorder (TUD) occurs when a person craves, seeks, and uses tobacco, regardless of the consequences. This disorder can cause problems with mental and physical health. It can affect your ability to have healthy relationships, and it can keep you from meeting your responsibilities at work, home, or school.  Tobacco may be:  Smoked as a cigarette or cigar.  Inhaled using e-cigarettes.  Smoked in a pipe or hookah.  Chewed as smokeless tobacco.  Inhaled into the nostrils as snuff.  Tobacco products contain a dangerous chemical called nicotine, which is very addictive. Nicotine triggers hormones that make the body feel stimulated and works on areas of the brain that make you feel good. These effects can make it hard for people to quit nicotine.  Tobacco contains many other unsafe chemicals that can damage almost every organ in the body. Smoking tobacco also puts others in danger due to fire risk and possible health problems caused by breathing in secondhand smoke.  What are the signs or symptoms?  Symptoms of TUD may include:  Being unable  to slow down or stop your tobacco use.  Spending an abnormal amount of time getting or using tobacco.  Craving tobacco. Cravings may last for up to 6 months after quitting.  Tobacco use that:  Interferes with your work, school, or home life.  Interferes with your personal and social relationships.  Makes you give up activities that you once enjoyed or found important.  Using tobacco even though you know that it is:  Dangerous or bad for your health or someone else's health.  Causing problems in your life.  Needing more and more of the substance to get the same effect (developing tolerance).  Experiencing unpleasant symptoms if you do not use the substance (withdrawal). Withdrawal symptoms may include:  Depressed, anxious, or irritable mood.  Difficulty concentrating.  Increased appetite.  Restlessness or trouble sleeping.  Using the substance to avoid withdrawal.  How is this diagnosed?  This condition may be diagnosed based on:  Your current and past tobacco use. Your health care provider may ask questions about how your tobacco use affects your life.  A physical exam.  You may be diagnosed with TUD if you have at least two symptoms within a 12-month period.  How is this treated?  This condition is treated by stopping tobacco use. Many people are unable to quit on their own and need help. Treatment may include:  Nicotine replacement therapy (NRT). NRT provides nicotine without the other harmful chemicals in tobacco. NRT gradually lowers the dosage of nicotine in the body and reduces withdrawal symptoms. NRT is available as:  Over-the-counter gums, lozenges, and skin patches.  Prescription mouth inhalers and nasal sprays.  Medicine that acts on the brain to reduce cravings and withdrawal symptoms.  A type of talk therapy that examines your triggers for tobacco use, how to avoid them, and how to cope with cravings (behavioral therapy).  Hypnosis. This may help with withdrawal symptoms.  Joining a support group for  others coping with TUD.  The best treatment for TUD is usually a combination of medicine, talk therapy, and support groups. Recovery can be a long process. Many people start using tobacco again after stopping (relapse). If you relapse, it does not mean that treatment will not work.  Follow these instructions at home:  Lifestyle  Do not use any products that contain nicotine or tobacco, such as cigarettes and e-cigarettes.  Avoid things that trigger tobacco use as much as you can. Triggers include people and situations that usually cause you to use tobacco.  Avoid drinks that contain caffeine, including coffee. These may worsen some withdrawal symptoms.  Find ways to manage stress. Wanting to smoke may cause stress, and stress can make you want to smoke. Relaxation techniques such as deep breathing, meditation, and yoga may help.  Attend support groups as needed. These groups are an important part of long-term recovery for many people.  General instructions  Take over-the-counter and prescription medicines only as told by your health care provider.  Check with your health care provider before taking any new prescription or over-the-counter medicines.  Decide on a friend, family member, or smoking quit-line (such as 1-800-QUIT-NOW in the [place] that you can call or text when you feel the urge to smoke or when you need help coping with cravings.  Keep all follow-up visits as told by your health care provider and therapist. This is important.  Contact a health care provider if:  You are not able to take your medicines as prescribed.  Your symptoms get worse, even with treatment.  Summary  Tobacco use disorder (TUD) occurs when a person craves, seeks, and uses tobacco regardless of the consequences.  This condition may be diagnosed based on your current and past tobacco use and a physical exam.  Many people are unable to quit on their own and need help. Recovery can be a long process.  The most effective treatment for TUD  is usually a combination of medicine, talk therapy, and support groups.  This information is not intended to replace advice given to you by your health care provider. Make sure you discuss any questions you have with your health care provider.  Document Revised: 08/26/2022 Document Reviewed: 11/09/2021  [employer] Patient Education © 2022 [employer]

## 2023-03-20 DIAGNOSIS — J44.9 CHRONIC OBSTRUCTIVE PULMONARY DISEASE, UNSPECIFIED COPD TYPE: ICD-10-CM

## 2023-03-20 RX ORDER — ALBUTEROL SULFATE 2.5 MG/3ML
SOLUTION RESPIRATORY (INHALATION)
Qty: 90 ML | Refills: 3 | Status: SHIPPED | OUTPATIENT
Start: 2023-03-20

## 2023-04-17 DIAGNOSIS — I10 ESSENTIAL HYPERTENSION: ICD-10-CM

## 2023-04-17 RX ORDER — ATENOLOL 50 MG/1
TABLET ORAL
Qty: 90 TABLET | Refills: 0 | OUTPATIENT
Start: 2023-04-17

## 2024-04-22 ENCOUNTER — HOSPITAL ENCOUNTER (OUTPATIENT)
Dept: GENERAL RADIOLOGY | Facility: HOSPITAL | Age: 64
Discharge: HOME | End: 2024-04-22
Admitting: NURSE PRACTITIONER
Payer: MEDICAID

## 2024-04-22 ENCOUNTER — OFFICE VISIT (OUTPATIENT)
Dept: NEUROSURGERY | Facility: CLINIC | Age: 64
End: 2024-04-22
Payer: MEDICAID

## 2024-04-22 VITALS — BODY MASS INDEX: 48.82 KG/M2 | HEIGHT: 65 IN | WEIGHT: 293 LBS

## 2024-04-22 DIAGNOSIS — M54.12 CERVICAL RADICULOPATHY: ICD-10-CM

## 2024-04-22 DIAGNOSIS — M50.30 DEGENERATION OF CERVICAL DISC WITHOUT MYELOPATHY: ICD-10-CM

## 2024-04-22 DIAGNOSIS — G89.4 CHRONIC PAIN DISORDER: ICD-10-CM

## 2024-04-22 DIAGNOSIS — F17.210 CIGARETTE SMOKER: ICD-10-CM

## 2024-04-22 DIAGNOSIS — R20.2 NUMBNESS AND TINGLING OF BOTH UPPER EXTREMITIES: ICD-10-CM

## 2024-04-22 DIAGNOSIS — E66.01 CLASS 3 SEVERE OBESITY DUE TO EXCESS CALORIES WITH SERIOUS COMORBIDITY AND BODY MASS INDEX (BMI) OF 45.0 TO 49.9 IN ADULT: ICD-10-CM

## 2024-04-22 DIAGNOSIS — R20.0 NUMBNESS AND TINGLING OF BOTH UPPER EXTREMITIES: ICD-10-CM

## 2024-04-22 DIAGNOSIS — M54.2 CERVICALGIA: Primary | ICD-10-CM

## 2024-04-22 PROCEDURE — 1160F RVW MEDS BY RX/DR IN RCRD: CPT | Performed by: NURSE PRACTITIONER

## 2024-04-22 PROCEDURE — 72052 X-RAY EXAM NECK SPINE 6/>VWS: CPT

## 2024-04-22 PROCEDURE — 99214 OFFICE O/P EST MOD 30 MIN: CPT | Performed by: NURSE PRACTITIONER

## 2024-04-22 PROCEDURE — 1159F MED LIST DOCD IN RCRD: CPT | Performed by: NURSE PRACTITIONER

## 2024-04-22 RX ORDER — ASPIRIN 81 MG/1
1 TABLET ORAL DAILY
COMMUNITY

## 2024-04-22 RX ORDER — MELOXICAM 15 MG/1
TABLET ORAL AS NEEDED
COMMUNITY

## 2024-04-22 RX ORDER — FLUTICASONE PROPIONATE 50 UG/1
SPRAY, METERED NASAL EVERY 24 HOURS
COMMUNITY
Start: 2024-01-08

## 2024-04-22 RX ORDER — BENZONATATE 100 MG/1
1 CAPSULE ORAL 3 TIMES DAILY
COMMUNITY
Start: 2024-01-08

## 2024-04-22 RX ORDER — SPIRONOLACTONE 25 MG/1
25 TABLET ORAL DAILY
COMMUNITY

## 2024-04-22 RX ORDER — CLOBETASOL PROPIONATE 0.5 MG/G
CREAM TOPICAL AS NEEDED
COMMUNITY

## 2024-04-22 NOTE — PATIENT INSTRUCTIONS
For more information:    Quit Now [place]  1-800-QUIT-NOW  https://[place].quitlogix.org/en-[place]/  Steps to Quit Smoking  Smoking tobacco can be harmful to your health and can affect almost every organ in your body. Smoking puts you, and those around you, at risk for developing many serious chronic diseases. Quitting smoking is difficult, but it is one of the best things that you can do for your health. It is never too late to quit.  What are the benefits of quitting smoking?  When you quit smoking, you lower your risk of developing serious diseases and conditions, such as:  Lung cancer or lung disease, such as COPD.  Heart disease.  Stroke.  Heart attack.  Infertility.  Osteoporosis and bone fractures.  Additionally, symptoms such as coughing, wheezing, and shortness of breath may get better when you quit. You may also find that you get sick less often because your body is stronger at fighting off colds and infections. If you are pregnant, quitting smoking can help to reduce your chances of having a baby of low birth weight.  How do I get ready to quit?  When you decide to quit smoking, create a plan to make sure that you are successful. Before you quit:  Pick a date to quit. Set a date within the next two weeks to give you time to prepare.  Write down the reasons why you are quitting. Keep this list in places where you will see it often, such as on your bathroom mirror or in your car or wallet.  Identify the people, places, things, and activities that make you want to smoke (triggers) and avoid them. Make sure to take these actions:  Throw away all cigarettes at home, at work, and in your car.  Throw away smoking accessories, such as ashtrays and lighters.  Clean your car and make sure to empty the ashtray.  Clean your home, including curtains and carpets.  Tell your family, friends, and coworkers that you are quitting. Support from your loved ones can make quitting easier.  Talk with your health care provider  about your options for quitting smoking.  Find out what treatment options are covered by your health insurance.  What strategies can I use to quit smoking?  Talk with your healthcare provider about different strategies to quit smoking. Some strategies include:  Quitting smoking altogether instead of gradually lessening how much you smoke over a period of time. Research shows that quitting “cold turkey” is more successful than gradually quitting.  Attending in-person counseling to help you build problem-solving skills. You are more likely to have success in quitting if you attend several counseling sessions. Even short sessions of 10 minutes can be effective.  Finding resources and support systems that can help you to quit smoking and remain smoke-free after you quit. These resources are most helpful when you use them often. They can include:  Online chats with a counselor.  Telephone quitlines.  Printed self-help materials.  Support groups or group counseling.  Text messaging programs.  Mobile phone applications.  Taking medicines to help you quit smoking. (If you are pregnant or breastfeeding, talk with your health care provider first.) Some medicines contain nicotine and some do not. Both types of medicines help with cravings, but the medicines that include nicotine help to relieve withdrawal symptoms. Your health care provider may recommend:  Nicotine patches, gum, or lozenges.  Nicotine inhalers or sprays.  Non-nicotine medicine that is taken by mouth.  Talk with your health care provider about combining strategies, such as taking medicines while you are also receiving in-person counseling. Using these two strategies together makes you more likely to succeed in quitting than if you used either strategy on its own.  If you are pregnant or breastfeeding, talk with your health care provider about finding counseling or other support strategies to quit smoking. Do not take medicine to help you quit smoking unless  told to do so by your health care provider.  What things can I do to make it easier to quit?  Quitting smoking might feel overwhelming at first, but there is a lot that you can do to make it easier. Take these important actions:  Reach out to your family and friends and ask that they support and encourage you during this time. Call telephone quitlines, reach out to support groups, or work with a counselor for support.  Ask people who smoke to avoid smoking around you.  Avoid places that trigger you to smoke, such as bars, parties, or smoke-break areas at work.  Spend time around people who do not smoke.  Lessen stress in your life, because stress can be a smoking trigger for some people. To lessen stress, try:  Exercising regularly.  Deep-breathing exercises.  Yoga.  Meditating.  Performing a body scan. This involves closing your eyes, scanning your body from head to toe, and noticing which parts of your body are particularly tense. Purposefully relax the muscles in those areas.  Download or purchase mobile phone or tablet apps (applications) that can help you stick to your quit plan by providing reminders, tips, and encouragement. There are many free apps, such as QuitGuide from the CDC (Centers for Disease Control and Prevention). You can find other support for quitting smoking (smoking cessation) through smokefree.gov and other websites.  How will I feel when I quit smoking?  Within the first 24 hours of quitting smoking, you may start to feel some withdrawal symptoms. These symptoms are usually most noticeable 2-3 days after quitting, but they usually do not last beyond 2-3 weeks. Changes or symptoms that you might experience include:  Mood swings.  Restlessness, anxiety, or irritation.  Difficulty concentrating.  Dizziness.  Strong cravings for sugary foods in addition to nicotine.  Mild weight gain.  Constipation.  Nausea.  Coughing or a sore throat.  Changes in how your medicines work in your body.  A  depressed mood.  Difficulty sleeping (insomnia).  After the first 2-3 weeks of quitting, you may start to notice more positive results, such as:  Improved sense of smell and taste.  Decreased coughing and sore throat.  Slower heart rate.  Lower blood pressure.  Clearer skin.  The ability to breathe more easily.  Fewer sick days.  Quitting smoking is very challenging for most people. Do not get discouraged if you are not successful the first time. Some people need to make many attempts to quit before they achieve long-term success. Do your best to stick to your quit plan, and talk with your health care provider if you have any questions or concerns.  This information is not intended to replace advice given to you by your health care provider. Make sure you discuss any questions you have with your health care provider.  Document Released: 12/12/2002 Document Revised: 08/15/2017 Document Reviewed: 05/03/2016  [employer] Interactive Patient Education © 2017 [employer]  BMI for Adults  What is BMI?  Body mass index (BMI) is a number that is calculated from a person's weight and height. BMI can help estimate how much of a person's weight is composed of fat. BMI does not measure body fat directly. Rather, it is an alternative to procedures that directly measure body fat, which can be difficult and expensive.  BMI can help identify people who may be at higher risk for certain medical problems.  What are BMI measurements used for?  BMI is used as a screening tool to identify possible weight problems. It helps determine whether a person is obese, overweight, a healthy weight, or underweight.  BMI is useful for:  Identifying a weight problem that may be related to a medical condition or may increase the risk for medical problems.  Promoting changes, such as changes in diet and exercise, to help reach a healthy weight. BMI screening can be repeated to see if these changes are working.  How is BMI calculated?  BMI involves  "measuring your weight in relation to your height. Both height and weight are measured, and the BMI is calculated from those numbers. This can be done either in English (U[place] or metric measurements. Note that charts and online BMI calculators are available to help you find your BMI quickly and easily without having to do these calculations yourself.  To calculate your BMI in English (U[place] measurements:    Measure your weight in pounds (lb).  Multiply the number of pounds by 703.  For example, for a person who weighs 180 lb, multiply that number by 703, which equals 126,540.  Measure your height in inches. Then multiply that number by itself to get a measurement called \"inches squared.\"  For example, for a person who is 70 inches tall, the \"inches squared\" measurement is 70 inches x 70 inches, which equals 4,900 inches squared.  Divide the total from step 2 (number of lb x 703) by the total from step 3 (inches squared): 126,540 ÷ 4,900 = 25.8. This is your BMI.    To calculate your BMI in metric measurements:  Measure your weight in kilograms (kg).  Measure your height in meters (m). Then multiply that number by itself to get a measurement called \"meters squared.\"  For example, for a person who is 1.75 m tall, the \"meters squared\" measurement is 1.75 m x 1.75 m, which is equal to 3.1 meters squared.  Divide the number of kilograms (your weight) by the meters squared number. In this example: 70 ÷ 3.1 = 22.6. This is your BMI.  What do the results mean?  BMI charts are used to identify whether you are underweight, normal weight, overweight, or obese. The following guidelines will be used:  Underweight: BMI less than 18.5.  Normal weight: BMI between 18.5 and 24.9.  Overweight: BMI between 25 and 29.9.  Obese: BMI of 30 or above.  Keep these notes in mind:  Weight includes both fat and muscle, so someone with a muscular build, such as an athlete, may have a BMI that is higher than 24.9. In cases like these, BMI is not " "an accurate measure of body fat.  To determine if excess body fat is the cause of a BMI of 25 or higher, further assessments may need to be done by a health care provider.  BMI is usually interpreted in the same way for men and women.  Where to find more information  For more information about BMI, including tools to quickly calculate your BMI, go to these websites:  Centers for Disease Control and Prevention: www.cdc.gov  American Heart Association: www.heart.org  National Heart, Lung, and Blood [place]: www.nhlbi.nih.gov  Summary  Body mass index (BMI) is a number that is calculated from a person's weight and height.  BMI may help estimate how much of a person's weight is composed of fat. BMI can help identify those who may be at higher risk for certain medical problems.  BMI can be measured using English measurements or metric measurements.  BMI charts are used to identify whether you are underweight, normal weight, overweight, or obese.  This information is not intended to replace advice given to you by your health care provider. Make sure you discuss any questions you have with your health care provider.  Document Revised: 09/09/2020 Document Reviewed: 07/17/2020  [employer] Patient Education © 2021 [employer] https://www.nhlbi.nih.gov/files/docs/public/heart/dash_brief.pdf\">   DASH Eating Plan  DASH stands for Dietary Approaches to Stop Hypertension. The DASH eating plan is a healthy eating plan that has been shown to:  Reduce high blood pressure (hypertension).  Reduce your risk for type 2 diabetes, heart disease, and stroke.  Help with weight loss.  What are tips for following this plan?  Reading food labels  Check food labels for the amount of salt (sodium) per serving. Choose foods with less than 5 percent of the Daily Value of sodium. Generally, foods with less than 300 milligrams (mg) of sodium per serving fit into this eating plan.  To find whole grains, look for the word \"whole\" as the first word in " "the ingredient list.  Shopping  Buy products labeled as \"low-sodium\" or \"no salt added.\"  Buy fresh foods. Avoid canned foods and pre-made or frozen meals.  Cooking  Avoid adding salt when cooking. Use salt-free seasonings or herbs instead of table salt or sea salt. Check with your health care provider or pharmacist before using salt substitutes.  Do not mcdonald foods. Cook foods using healthy methods such as baking, boiling, grilling, roasting, and broiling instead.  Cook with heart-healthy oils, such as olive, canola, avocado, soybean, or sunflower oil.  Meal planning    Eat a balanced diet that includes:  4 or more servings of fruits and 4 or more servings of vegetables each day. Try to fill one-half of your plate with fruits and vegetables.  6-8 servings of whole grains each day.  Less than 6 oz (170 g) of lean meat, poultry, or fish each day. A 3-oz (85-g) serving of meat is about the same size as a deck of cards. One egg equals 1 oz (28 g).  2-3 servings of low-fat dairy each day. One serving is 1 cup (237 mL).  1 serving of nuts, seeds, or beans 5 times each week.  2-3 servings of heart-healthy fats. Healthy fats called omega-3 fatty acids are found in foods such as walnuts, flaxseeds, fortified milks, and eggs. These fats are also found in cold-water fish, such as sardines, salmon, and mackerel.  Limit how much you eat of:  Canned or prepackaged foods.  Food that is high in trans fat, such as some fried foods.  Food that is high in saturated fat, such as fatty meat.  Desserts and other sweets, sugary drinks, and other foods with added sugar.  Full-fat dairy products.  Do not salt foods before eating.  Do not eat more than 4 egg yolks a week.  Try to eat at least 2 vegetarian meals a week.  Eat more home-cooked food and less restaurant, buffet, and fast food.    Lifestyle  When eating at a restaurant, ask that your food be prepared with less salt or no salt, if possible.  If you drink alcohol:  Limit how much you " use to:  0-1 drink a day for women who are not pregnant.  0-2 drinks a day for men.  Be aware of how much alcohol is in your drink. In the [place] one drink equals one 12 oz bottle of beer (355 mL), one 5 oz glass of wine (148 mL), or one 1½ oz glass of hard liquor (44 mL).  General information  Avoid eating more than 2,300 mg of salt a day. If you have hypertension, you may need to reduce your sodium intake to 1,500 mg a day.  Work with your health care provider to maintain a healthy body weight or to lose weight. Ask what an ideal weight is for you.  Get at least 30 minutes of exercise that causes your heart to beat faster (aerobic exercise) most days of the week. Activities may include walking, swimming, or biking.  Work with your health care provider or dietitian to adjust your eating plan to your individual calorie needs.  What foods should I eat?  Fruits  All fresh, dried, or frozen fruit. Canned fruit in natural juice (without added sugar).  Vegetables  Fresh or frozen vegetables (raw, steamed, roasted, or grilled). Low-sodium or reduced-sodium tomato and vegetable juice. Low-sodium or reduced-sodium tomato sauce and tomato paste. Low-sodium or reduced-sodium canned vegetables.  Grains  Whole-grain or whole-wheat bread. Whole-grain or whole-wheat pasta. Brown rice. Oatmeal. Quinoa. Bulgur. Whole-grain and low-sodium cereals. Renetta bread. Low-fat, low-sodium crackers. Whole-wheat flour tortillas.  Meats and other proteins  Skinless chicken or turkey. Ground chicken or turkey. Pork with fat trimmed off. Fish and seafood. Egg whites. Dried beans, peas, or lentils. Unsalted nuts, nut butters, and seeds. Unsalted canned beans. Lean cuts of beef with fat trimmed off. Low-sodium, lean precooked or cured meat, such as sausages or meat loaves.  Dairy  Low-fat (1%) or fat-free (skim) milk. Reduced-fat, low-fat, or fat-free cheeses. Nonfat, low-sodium ricotta or cottage cheese. Low-fat or nonfat yogurt. Low-fat, low-sodium  cheese.  Fats and oils  Soft margarine without trans fats. Vegetable oil. Reduced-fat, low-fat, or light mayonnaise and salad dressings (reduced-sodium). Canola, safflower, olive, avocado, soybean, and sunflower oils. Avocado.  Seasonings and condiments  Herbs. Spices. Seasoning mixes without salt.  Other foods  Unsalted popcorn and pretzels. Fat-free sweets.  The items listed above may not be a complete list of foods and beverages you can eat. Contact a dietitian for more information.  What foods should I avoid?  Fruits  Canned fruit in a light or heavy syrup. Fried fruit. Fruit in cream or butter sauce.  Vegetables  Creamed or fried vegetables. Vegetables in a cheese sauce. Regular canned vegetables (not low-sodium or reduced-sodium). Regular canned tomato sauce and paste (not low-sodium or reduced-sodium). Regular tomato and vegetable juice (not low-sodium or reduced-sodium). Pickles. Olives.  Grains  Baked goods made with fat, such as croissants, muffins, or some breads. Dry pasta or rice meal packs.  Meats and other proteins  Fatty cuts of meat. Ribs. Fried meat. Castro. Bologna, salami, and other precooked or cured meats, such as sausages or meat loaves. Fat from the back of a pig (fatback). Bratwurst. Salted nuts and seeds. Canned beans with added salt. Canned or smoked fish. Whole eggs or egg yolks. Chicken or turkey with skin.  Dairy  Whole or 2% milk, cream, and half-and-half. Whole or full-fat cream cheese. Whole-fat or sweetened yogurt. Full-fat cheese. Nondairy creamers. Whipped toppings. Processed cheese and cheese spreads.  Fats and oils  Butter. Stick margarine. Lard. Shortening. Ghee. Castro fat. Tropical oils, such as coconut, palm kernel, or palm oil.  Seasonings and condiments  Onion salt, garlic salt, seasoned salt, table salt, and sea salt. [facility] sauce. Tartar sauce. Barbecue sauce. Teriyaki sauce. Soy sauce, including reduced-sodium. Steak sauce. Canned and packaged gravies. Fish sauce.  Oyster sauce. Cocktail sauce. Store-bought horseradish. Ketchup. Mustard. Meat flavorings and tenderizers. Bouillon cubes. Hot sauces. Pre-made or packaged marinades. Pre-made or packaged taco seasonings. Relishes. Regular salad dressings.  Other foods  Salted popcorn and pretzels.  The items listed above may not be a complete list of foods and beverages you should avoid. Contact a dietitian for more information.  Where to find more information  National Heart, Lung, and Blood [place]: www.nhlbi.nih.gov  American Heart Association: www.heart.org  Academy of Nutrition and Dietetics: www.eatright.org  National Kidney Foundation: www.kidney.org  Summary  The DASH eating plan is a healthy eating plan that has been shown to reduce high blood pressure (hypertension). It may also reduce your risk for type 2 diabetes, heart disease, and stroke.  When on the DASH eating plan, aim to eat more fresh fruits and vegetables, whole grains, lean proteins, low-fat dairy, and heart-healthy fats.  With the DASH eating plan, you should limit salt (sodium) intake to 2,300 mg a day. If you have hypertension, you may need to reduce your sodium intake to 1,500 mg a day.  Work with your health care provider or dietitian to adjust your eating plan to your individual calorie needs.  This information is not intended to replace advice given to you by your health care provider. Make sure you discuss any questions you have with your health care provider.  Document Revised: 11/20/2020 Document Reviewed: 11/20/2020  [employer] Patient Education © 2021 [employer] High-Protein and High-Calorie Diet  Eating high-protein and high-calorie foods can help you to gain weight, heal after an injury, and recover after an illness or surgery. The specific amount of daily protein and calories you need depends on:  Your body weight.  The reason this diet is recommended for you.  What is my plan?  Generally, a high-protein, high-calorie diet involves:  Eating  250-500 extra calories each day.  Making sure that you get enough of your daily calories from protein. Ask your health care provider how many of your calories should come from protein.  Talk with a health care provider, such as a diet and nutrition specialist (dietitian), about how much protein and how many calories you need each day. Follow the diet as directed by your health care provider.  What are tips for following this plan?    Preparing meals  Add whole milk, half-and-half, or heavy cream to cereal, pudding, soup, or hot cocoa.  Add whole milk to instant breakfast drinks.  Add peanut butter to oatmeal or smoothies.  Add powdered milk to baked goods, smoothies, or milkshakes.  Add powdered milk, cream, or butter to mashed potatoes.  Add cheese to cooked vegetables.  Make whole-milk yogurt parfaits. Top them with granola, fruit, or nuts.  Add cottage cheese to your fruit.  Add avocado, cheese, or both to sandwiches or salads.  Add meat, poultry, or seafood to rice, pasta, casseroles, salads, and soups.  Use mayonnaise when making egg salad, chicken salad, or tuna salad.  Use peanut butter as a dip for vegetables or as a topping for pretzels, celery, or crackers.  Add beans to casseroles, dips, and spreads.  Add pureed beans to sauces and soups.  Replace calorie-free drinks with calorie-containing drinks, such as milk and fruit juice.  Replace water with milk or heavy cream when making foods such as oatmeal, pudding, or cocoa.  General instructions  Ask your health care provider if you should take a nutritional supplement.  Try to eat six small meals each day instead of three large meals.  Eat a balanced diet. In each meal, include one food that is high in protein.  Keep nutritious snacks available, such as nuts, trail mixes, dried fruit, and yogurt.  If you have kidney disease or diabetes, talk with your health care provider about how much protein is safe for you. Too much protein may put extra stress on your  kidneys.  Drink your calories. Choose high-calorie drinks and have them after your meals.  What high-protein foods should I eat?    Vegetables  Soybeans. Peas.  Grains  Quinoa. Bulgur wheat.  Meats and other proteins  Beef, pork, and poultry. Fish and seafood. Eggs. Tofu. Textured vegetable protein (TVP). Peanut butter. Nuts and seeds. Dried beans. Protein powders.  Dairy  Whole milk. Whole-milk yogurt. Powdered milk. Cheese. Cottage Cheese. Eggnog.  Beverages  High-protein supplement drinks. Soy milk.  Other foods  Protein bars.  The items listed above may not be a complete list of high-protein foods and beverages. Contact a dietitian for more options.  What high-calorie foods should I eat?  Fruits  Dried fruit. Fruit leather. Canned fruit in syrup. Fruit juice. Avocado.  Vegetables  Vegetables cooked in oil or butter. Fried potatoes.  Grains  Pasta. Quick breads. Muffins. Pancakes. Ready-to-eat cereal.  Meats and other proteins  Peanut butter. Nuts and seeds.  Dairy  Heavy cream. Whipped cream. Cream cheese. Sour cream. Ice cream. Custard. Pudding.  Beverages  Meal-replacement beverages. Nutrition shakes. Fruit juice. Sugar-sweetened soft drinks.  Seasonings and condiments  Salad dressing. Mayonnaise. Dick sauce. Fruit preserves or jelly. Honey. Syrup.  Sweets and desserts  Cake. Cookies. Pie. Pastries. Candy bars. Chocolate.  Fats and oils  Butter or margarine. Oil. Gravy.  Other foods  Meal-replacement bars.  The items listed above may not be a complete list of high-calorie foods and beverages. Contact a dietitian for more options.  Summary  A high-protein, high-calorie diet can help you gain weight or heal faster after an injury, illness, or surgery.  To increase your protein and calories, add ingredients such as whole milk, peanut butter, cheese, beans, meat, or seafood to meal items.  To get enough extra calories each day, include high-calorie foods and beverages at each meal.  Adding a high-calorie drink  or shake can be an easy way to help you get enough calories each day. Talk with your healthcare provider or dietitian about the best options for you.  This information is not intended to replace advice given to you by your health care provider. Make sure you discuss any questions you have with your health care provider.  Document Revised: 11/30/2018 Document Reviewed: 10/30/2018  [employer] Patient Education © 2021 [employer]

## 2024-04-22 NOTE — PROGRESS NOTES
Chief complaint:   Chief Complaint   Patient presents with    Neck Pain     Patient here for reevaluation of neck pain       Subjective     HPI: This is a 63-year-old female patient who was seen in the past for neck issues.  She comes in today for reevaluation of her neck pain.  Patient says the pain in her neck is constant.  Is worse with breathing.  It is better with heat.  She has pain that radiates from her neck into her arms bilaterally.  She also does have numbness and tingling in her arms as well.  The patient does have a history of previous shoulder surgery bilaterally as well as bilateral cubital tunnel syndrome.  Denies any bowel or bladder incontinence.  She has not done any recent physical therapy or chiropractic care.  She has not had any recent injections in her neck.  Rates the pain on a scale 0-10 at an 8.  The patient does walk with a walker.  She has had a history of back surgeries as well    Review of Systems   Musculoskeletal:  Positive for arthralgias, back pain, gait problem, myalgias and neck pain.   Neurological:  Positive for numbness.   All other systems reviewed and are negative.       Past Medical History:   Diagnosis Date    Cervical disc disorder     Fibromyalgia, primary     Hyperlipidemia     Hypertension     Low back pain      Past Surgical History:   Procedure Laterality Date    ARM NERVE EXPLORATION Bilateral     both elbows    BACK SURGERY      X 4     EXPLORATORY LAPAROTOMY      MOUTH SURGERY      x2    REPLACEMENT TOTAL KNEE Right     SHOULDER SURGERY Bilateral     exploratory surgery x5    TUBAL ABDOMINAL LIGATION       History reviewed. No pertinent family history.  Social History     Tobacco Use    Smoking status: Some Days     Current packs/day: 0.50     Types: Cigarettes    Smokeless tobacco: Never    Tobacco comments:     between none and 10 daily   Vaping Use    Vaping status: Never Used   Substance Use Topics    Alcohol use: Not Currently    Drug use: Not Currently  "    Types: Marijuana     Comment: once every 2 weeks or so not currently     (Not in a hospital admission)    Allergies:  Citrus, Fruit extracts, Penicillins, and Percocet [oxycodone-acetaminophen]    Objective      Vital Signs  Ht 165.1 cm (65\")   Wt 134 kg (296 lb)   BMI 49.26 kg/m²     Physical Exam  Constitutional:       Appearance: Normal appearance. She is well-developed.   HENT:      Head: Normocephalic.   Eyes:      General: Lids are normal.      Extraocular Movements: EOM normal.      Conjunctiva/sclera: Conjunctivae normal.      Pupils: Pupils are equal, round, and reactive to light.   Pulmonary:      Effort: Pulmonary effort is normal.      Breath sounds: Normal breath sounds.   Musculoskeletal:         General: Normal range of motion.      Cervical back: Normal range of motion.   Skin:     General: Skin is warm.   Neurological:      Mental Status: She is alert and oriented to person, place, and time.      GCS: GCS eye subscore is 4. GCS verbal subscore is 5. GCS motor subscore is 6.      Cranial Nerves: No cranial nerve deficit.      Sensory: No sensory deficit.      Motor: Motor strength is normal.     Deep Tendon Reflexes: Reflexes are normal and symmetric. Reflexes normal.      Comments: Walking independently with a walker   Psychiatric:         Speech: Speech normal.         Behavior: Behavior normal.         Thought Content: Thought content normal.         Neurologic Exam     Mental Status   Oriented to person, place, and time.   Attention: normal. Concentration: normal.   Speech: speech is normal   Level of consciousness: alert  Normal comprehension.     Cranial Nerves     CN II   Visual fields full to confrontation.     CN III, IV, VI   Pupils are equal, round, and reactive to light.  Extraocular motions are normal.     CN V   Facial sensation intact.     CN VII   Facial expression full, symmetric.     CN VIII   CN VIII normal.     CN IX, X   CN IX normal.   CN X normal.     CN XI   CN XI " normal.     CN XII   CN XII normal.     Motor Exam   Muscle bulk: normal    Strength   Strength 5/5 throughout.     Sensory Exam   Light touch normal.       Imaging review: No new imaging        Assessment/Plan: Patient is complaining of worsening neck pain.  I would send her for x-rays of the cervical spine to include standing flexion and extension.  I am also going to send her for an MRI of the cervical spine.  I will have her follow-up with Dr. Mejía the next available appointment.  She was told to call us if any further problems or concerns.  She is set up to have a pain management appointment in a few months.  Patient is a smoker. Smoking cessation classes given to the patient  The patient's Body mass index is 49.26 kg/m².. BMI is above normal parameters. Recommendations include: educational material and nutrition counseling    Diagnoses and all orders for this visit:    1. Cervicalgia (Primary)  -     MRI Cervical Spine Without Contrast; Future  -     XR Spine Cervical Complete With Obli Flex Ext    2. Degeneration of cervical disc without myelopathy  -     MRI Cervical Spine Without Contrast; Future  -     XR Spine Cervical Complete With Obli Flex Ext    3. Chronic pain disorder  -     MRI Cervical Spine Without Contrast; Future  -     XR Spine Cervical Complete With Obli Flex Ext    4. Cervical radiculopathy  -     MRI Cervical Spine Without Contrast; Future  -     XR Spine Cervical Complete With Obli Flex Ext    5. Numbness and tingling of both upper extremities    6. Cigarette smoker    7. Class 3 severe obesity due to excess calories with serious comorbidity and body mass index (BMI) of 45.0 to 49.9 in adult          I discussed the patients findings and my recommendations with patient    Karl Newman, APRN  04/22/24  14:31 CDT

## 2024-08-13 ENCOUNTER — TELEPHONE (OUTPATIENT)
Dept: NEUROSURGERY | Facility: CLINIC | Age: 64
End: 2024-08-13
Payer: MEDICAID

## 2024-08-13 NOTE — TELEPHONE ENCOUNTER
Placed a call to inform pt she has to go through physical therapy first and then she will need to be seen back in 8 weeks for reevaluation and to document everything and we will get her rescheduled after that. Per pt she doesn't understand why she is having to go through physical therapy again when she has already done it. Pt was asked when was the last time she has done therapy? Pt stated she has completed physical therapy with in the past year at [facility] and [facility]. Informed pt I checked her chart and I am only seeing the on from [facility] [place] PT notes from 2022 and I would have to contact anayeli gustafson. Pt asked did she speak with me earlier . Informed pt she did not speak with me it ma have been scheduling that she spoke with. Pt stated she was told that it wasn't medically necessary and that's why it was denied. I explained to pt that is what the note says in the referral but the actual information I got from Karl that he got from the insurance company is pt needs to go through physical therapy and will need to be seen back in 8wks for documentation. I informed pt I would double check with our referral person about the physical therapy notes because I think the notes have to be with in the last 6mos.  I reached out to Hanna who informed me that 6mos is correct for physical therapy notes and she would have to redo physical therapy. Pt stated physical therapy doesn't help and every time she does it they tell her they can't help her and it makes her pain worse. Pt stated she just wants to cancel her appt for tomorrow and she will find somewhere else to go because she doesn't like that she wasn't told this before and she doesn't feel she shouldn't have to keep doing physical therapy.